# Patient Record
Sex: MALE | Race: WHITE | Employment: FULL TIME
[De-identification: names, ages, dates, MRNs, and addresses within clinical notes are randomized per-mention and may not be internally consistent; named-entity substitution may affect disease eponyms.]

---

## 2017-12-24 ENCOUNTER — HEALTH MAINTENANCE LETTER (OUTPATIENT)
Age: 27
End: 2017-12-24

## 2019-01-24 ENCOUNTER — HOSPITAL ENCOUNTER (EMERGENCY)
Facility: CLINIC | Age: 29
Discharge: HOME | End: 2019-01-24
Attending: PHYSICIAN ASSISTANT | Admitting: PHYSICIAN ASSISTANT
Payer: COMMERCIAL

## 2019-01-24 VITALS
HEART RATE: 84 BPM | DIASTOLIC BLOOD PRESSURE: 102 MMHG | RESPIRATION RATE: 18 BRPM | HEIGHT: 64 IN | TEMPERATURE: 98.5 F | OXYGEN SATURATION: 95 % | SYSTOLIC BLOOD PRESSURE: 143 MMHG | BODY MASS INDEX: 45.97 KG/M2

## 2019-01-24 DIAGNOSIS — M54.2 NECK PAIN: ICD-10-CM

## 2019-01-24 PROCEDURE — 25000132 ZZH RX MED GY IP 250 OP 250 PS 637: Performed by: PHYSICIAN ASSISTANT

## 2019-01-24 PROCEDURE — 99283 EMERGENCY DEPT VISIT LOW MDM: CPT

## 2019-01-24 RX ORDER — CYCLOBENZAPRINE HCL 10 MG
10 TABLET ORAL 3 TIMES DAILY PRN
Qty: 20 TABLET | Refills: 0 | Status: SHIPPED | OUTPATIENT
Start: 2019-01-24 | End: 2019-03-29

## 2019-01-24 RX ORDER — LIDOCAINE 4 G/G
1 PATCH TOPICAL ONCE
Status: DISCONTINUED | OUTPATIENT
Start: 2019-01-24 | End: 2019-01-25 | Stop reason: HOSPADM

## 2019-01-24 RX ORDER — OXYCODONE HYDROCHLORIDE 5 MG/1
5 TABLET ORAL ONCE
Status: COMPLETED | OUTPATIENT
Start: 2019-01-24 | End: 2019-01-24

## 2019-01-24 RX ORDER — CYCLOBENZAPRINE HCL 10 MG
10 TABLET ORAL ONCE
Status: COMPLETED | OUTPATIENT
Start: 2019-01-24 | End: 2019-01-24

## 2019-01-24 RX ORDER — LIDOCAINE 50 MG/G
1 PATCH TOPICAL EVERY 24 HOURS
Qty: 7 PATCH | Refills: 0 | Status: SHIPPED | OUTPATIENT
Start: 2019-01-24 | End: 2019-03-29

## 2019-01-24 RX ADMIN — OXYCODONE HYDROCHLORIDE 5 MG: 5 TABLET ORAL at 22:33

## 2019-01-24 RX ADMIN — CYCLOBENZAPRINE HYDROCHLORIDE 10 MG: 10 TABLET, FILM COATED ORAL at 22:33

## 2019-01-24 RX ADMIN — LIDOCAINE 1 PATCH: 560 PATCH PERCUTANEOUS; TOPICAL; TRANSDERMAL at 22:33

## 2019-01-24 ASSESSMENT — ENCOUNTER SYMPTOMS
NECK PAIN: 1
WEAKNESS: 0
NUMBNESS: 0
VOMITING: 0
NAUSEA: 1

## 2019-01-24 NOTE — LETTER
January 24, 2019      To Whom It May Concern:      Osorio Ceron was seen in our Emergency Department today, 01/24/19. Please excuse Osorio from work tomorrow(1/15/19)  I expect his condition to improve over the next 2-3 days.  He may return to work/school when improved.    Sincerely,        Nicole Oviedo PA-C

## 2019-01-24 NOTE — ED AVS SNAPSHOT
Emergency Department  [address] [facility] [address]  Phone:  [phone]  Fax:  [phone]                                    Osorio Ceron   MRN: [id number]    Department:   Emergency Department   Date of Visit:  1/24/2019           After Visit Summary Signature Page    I have received my discharge instructions, and my questions have been answered. I have discussed any challenges I see with this plan with the nurse or doctor.    ..........................................................................................................................................  Patient/Patient Representative Signature      ..........................................................................................................................................  Patient Representative Print Name and Relationship to Patient    ..................................................               ................................................  Date                                   Time    ..........................................................................................................................................  Reviewed by Signature/Title    ...................................................              ..............................................  Date                                               Time          22EPIC Rev 08/18

## 2019-01-25 NOTE — DISCHARGE INSTRUCTIONS
Discharge Instructions  Neck Strain    You have been seen today for a neck sprain or strain.  Neck strains usually result from an injury to the neck. Car accidents, contact sports, and falls are common causes of neck strain. Sometimes your neck can start to hurt because of increased activity, muscle tension, an abnormal sleeping position, or because of other problems like arthritis in the neck.     Neck pain usually comes from injured muscles and ligaments. Sometimes there is a herniated (?slipped?) disc. We do not usually do MRI scans to look for these right away, since most herniated discs will get better on their own with time. Today, we did not find any evidence that your neck pain was caused by a serious or dangerous condition. However, sometimes symptoms develop over time and cannot be found during an emergency visit, so it is very important that you follow up with your primary provider.    Generally, every Emergency Department visit should have a follow-up clinic visit with either a primary or a specialty clinic/provider. Please follow-up as instructed by your emergency provider today.    Return to the Emergency Department if:  You have increasing pain in your neck.  You develop difficulty swallowing or breathing.  You have numbness, weakness, or trouble moving your arms or legs.  You have severe dizziness and difficulty walking.  You are unable to control your bladder or bowels.  You develop severe headache or ringing in the ears.    What can I do to help myself at home?  If you had an injury, use cold for the first 1-2 days. Cold helps relieve pain and reduce inflammation.  Apply ice packs to the neck or areas of pain every 1-2 hours for 20 minutes at a time. Place a towel or cloth between your skin and the ice pack.  After the first 2 days, using heat can help with neck pain and stiffness. You may use a warm shower or bath, warm towels on the neck, or a heating pad. Do not sleep with a heating pad, as you  can be burned.   Pain medications - You may take a pain medication such as Tylenol  (acetaminophen), Advil  and Motrin  (ibuprofen), or Aleve  (naproxen).  It is usually best to rest the neck for 1-2 days after an injury, then start gentle stretching exercises.   It is helpful to place a small pillow under the nape of your neck to provide proper neutral positioning.   You should stay active and do your usual work as much as you can, unless this involves heavy physical labor. Ask your provider if you need work restrictions.  If you were given a prescription for medicine here today, be sure to read all of the information (including the package insert) that comes with your prescription.  This will include important information about the medicine, its side effects, and any warnings that you need to know about.  The pharmacist who fills the prescription can provide more information and answer questions you may have about the medicine.  If you have questions or concerns that the pharmacist cannot address, please call or return to the Emergency Department.   Remember that you can always come back to the Emergency Department if you are not able to see your regular provider in the amount of time listed above, if you get any new symptoms, or if there is anything that worries you.

## 2019-01-25 NOTE — ED PROVIDER NOTES
"  History     Chief Complaint:    Neck Pain    HPI   Nadiya \"Osorio\" Osmany is a 28 year old male who presents with neck pain. The patient reports that a few days ago he had right sided neck cramping that seemed to improve. Today, the patient woke up with the neck cramping back and its so painful that he wants to keep his head tucked into his right shoulder. He has been taking Ibuprofen and Aleve to combat the symptoms, with minimal relief. He notes that he is also nauseated 2/2 pain. The patient denies fever, numbness, tingling, weakness, vomiting, visual changes, or trauma to the neck or arms. No other associated neurologic symptoms. No other concerns at this time.     Allergies:  No known drug allergies.       Medications:    Atarax  Zofran  Zoloft  Depotesterone Cypoinoate     Problem List:    Mastecteomy, bilatral     Past Medical History:    Anxiett  Depression  obseity     Past Surgical History:    Tonsillectomy  Molar extractions  Transgender mastectomy     Family History:    Family history reviewed. No pertinent family history.     Social History:  Smoking Status: Never Smoker  Smokeless Tobacco: Never Used  Alcohol Use: Negative  Drug Use: Negative  PCP: Keaton Recio   Marital Status:  Single        Review of Systems   Eyes: Negative for visual disturbance.   Gastrointestinal: Positive for nausea. Negative for vomiting.   Musculoskeletal: Positive for neck pain.   Neurological: Negative for weakness and numbness.   All other systems reviewed and are negative.    Physical Exam     Patient Vitals for the past 24 hrs:   BP Temp Temp src Heart Rate Resp SpO2 Height   01/24/19 2148 (!) 166/113 98.5  F (36.9  C) Oral 109 18 99 % 1.626 m (5' 4\")      Physical Exam  General: Alert and oriented. Appears uncomfortable  Head:  The scalp, face, and head appear normal   Eyes:  Conjunctivae and sclerae are normal    ENT:    The oropharynx is normal    Uvula is in the midline     Moist mucous " membranes   Neck:  Patient has preserved ROM, but some difficulty 2/2 especially with leftward rotation.     No lymphadenopathy    There is no midline cervical spine pain/tenderness     Tenderness to the right paracervical area and trapezial area.   CV:  Regular rate and rhythm     Normal S1/S2    Radial pulses intact bilaterally.   Resp:  Lungs are clear to auscultation    Non-labored    No rales or wheezing   MS:  Preserved bicep, triceps, and deltoid strength bilaterally. Patient is able to hold fingers in resisted abduction, make the ok sign and the thumbs up sign bilaterally.   Skin:  No rash or acute skin lesions noted   Neuro: Speech is normal and fluent. Sensation intact in bilateral upper extremities.       Emergency Department Course     Interventions:  2233 Flexeril 10 mg PO  2233 Roxicodone 5 mg PO  2233 Lidocare 1 patch transdermal     Medications   Lidocaine (LIDOCARE) 4 % Patch 1 patch (1 patch Transdermal Given 1/24/19 2233)     And   lidocaine patch REMOVAL (not administered)     And   lidocaine patch in PLACE (not administered)   cyclobenzaprine (FLEXERIL) tablet 10 mg (10 mg Oral Given 1/24/19 2233)   oxyCODONE (ROXICODONE) tablet 5 mg (5 mg Oral Given 1/24/19 2233)        Emergency Department Course:     Nursing notes and vitals reviewed.    2220 I performed an exam of the patient as documented above.      I personally reviewed the exam results with the patient and answered all related questions prior to discharge.    Impression & Plan      Medical Decision Making:  Nadiya Ceron is a 28 year old female who presents to the emergency department today for evaluation of neck pain. He states this happened upon waking one day and was consistent with a neck strain. He denies any trauma or any neurologic symptoms/visula changes. Clinical examination is consistent with neck strain and muscle spasm. Without trauma no need for xrays. There is no evidence of cervical radiculopathy, ligamentous  instability, myelopathy, dissection, spinal tumor or abscess at this time. No need for MRI/CT at this time. I discussed worrisome symptoms/signs, if they were to evolve, that should prompt the patient to follow up more quickly or return to the ER. There are no red flag symptoms to suggest we need further workup or advanced imaging at this point. He was given the above interventions with improvement in symptoms. He will be discharged home with a prescription for Lidoderm patches and flexeril. He will follow up with his PCP in 2 days for recheck. He was asked to return immediately for weakness, numbness, tingling, fevers, vomiting, visual changes, uncontrolled pain or another concerns. All questions were answered prior to discharge. The patient understands and agrees to this plan.    Diagnosis:    ICD-10-CM    1. Neck pain M54.2         Disposition:   The patient is discharged to home.     Discharge Medications:     Review of your medicines      UNREVIEWED medicines. Ask your doctor about these medicines      Dose / Directions   azithromycin 250 MG tablet  Commonly known as:  ZITHROMAX  Used for:  S/P mastectomy, bilateral      Two tablets first day, then one tablet daily for four days.  Quantity:  6 tablet  Refills:  0     hydrOXYzine 25 MG tablet  Commonly known as:  ATARAX  Used for:  S/P mastectomy, bilateral      Dose:  25-50 mg  Take 1-2 tablets (25-50 mg) by mouth every 6 hours as needed for itching  Quantity:  60 tablet  Refills:  1     ondansetron 4 MG ODT tab  Commonly known as:  ZOFRAN-ODT  Used for:  S/P mastectomy, bilateral      Dose:  4-8 mg  Take 1-2 tablets (4-8 mg) by mouth every 8 hours as needed for nausea Dissolve ON the tongue.  Quantity:  20 tablet  Refills:  0     oxyCODONE 5 MG tablet  Commonly known as:  ROXICODONE  Used for:  S/P mastectomy, bilateral      Dose:  5-10 mg  Take 1-2 tablets (5-10 mg) by mouth every 3 hours as needed for pain or other (Moderate to Severe)  Quantity:  60  tablet  Refills:  0     testosterone cypionate 200 MG/ML injection  Commonly known as:  DEPOTESTOSTERONE      Dose:  50 mg  Inject 50 mg into the muscle once a week  Refills:  0     ZOLOFT PO      Dose:  150 mg  Take 150 mg by mouth daily  Refills:  0        START taking      Dose / Directions   cyclobenzaprine 10 MG tablet  Commonly known as:  FLEXERIL      Dose:  10 mg  Take 1 tablet (10 mg) by mouth 3 times daily as needed for muscle spasms  Quantity:  20 tablet  Refills:  0     lidocaine 5 % patch  Commonly known as:  LIDODERM      Dose:  1 patch  Place 1 patch onto the skin every 24 hours for 7 days  Quantity:  7 patch  Refills:  0           Where to get your medicines      Some of these will need a paper prescription and others can be bought over the counter. Ask your nurse if you have questions.    Bring a paper prescription for each of these medications    cyclobenzaprine 10 MG tablet    lidocaine 5 % patch         Scribe Disclosure:  I, Orla Severson, am serving as a scribe at 9:47 PM on 1/24/2019 to document services personally performed by Nicole Oviedo PA-C based on my observations and the provider's statements to me.      EMERGENCY DEPARTMENT       Nicole Oviedo PA-C  01/26/19 1187

## 2019-01-26 ENCOUNTER — APPOINTMENT (OUTPATIENT)
Dept: MRI IMAGING | Facility: CLINIC | Age: 29
End: 2019-01-26
Payer: COMMERCIAL

## 2019-01-26 ENCOUNTER — HOSPITAL ENCOUNTER (EMERGENCY)
Facility: CLINIC | Age: 29
Discharge: HOME | End: 2019-01-26
Attending: EMERGENCY MEDICINE | Admitting: EMERGENCY MEDICINE
Payer: COMMERCIAL

## 2019-01-26 VITALS
DIASTOLIC BLOOD PRESSURE: 102 MMHG | RESPIRATION RATE: 16 BRPM | HEART RATE: 94 BPM | HEIGHT: 64 IN | TEMPERATURE: 97.7 F | WEIGHT: 271 LBS | BODY MASS INDEX: 46.26 KG/M2 | SYSTOLIC BLOOD PRESSURE: 140 MMHG | OXYGEN SATURATION: 95 %

## 2019-01-26 DIAGNOSIS — M54.12 CERVICAL RADICULOPATHY: ICD-10-CM

## 2019-01-26 PROCEDURE — 96374 THER/PROPH/DIAG INJ IV PUSH: CPT

## 2019-01-26 PROCEDURE — 72141 MRI NECK SPINE W/O DYE: CPT

## 2019-01-26 PROCEDURE — 99285 EMERGENCY DEPT VISIT HI MDM: CPT | Mod: 25

## 2019-01-26 PROCEDURE — 96375 TX/PRO/DX INJ NEW DRUG ADDON: CPT

## 2019-01-26 PROCEDURE — 25000128 H RX IP 250 OP 636: Performed by: EMERGENCY MEDICINE

## 2019-01-26 RX ORDER — PREDNISONE 20 MG/1
TABLET ORAL
Qty: 10 TABLET | Refills: 0 | Status: SHIPPED | OUTPATIENT
Start: 2019-01-26 | End: 2019-03-29

## 2019-01-26 RX ORDER — LORAZEPAM 2 MG/ML
1 INJECTION INTRAMUSCULAR ONCE
Status: COMPLETED | OUTPATIENT
Start: 2019-01-26 | End: 2019-01-26

## 2019-01-26 RX ORDER — KETOROLAC TROMETHAMINE 15 MG/ML
15 INJECTION, SOLUTION INTRAMUSCULAR; INTRAVENOUS ONCE
Status: COMPLETED | OUTPATIENT
Start: 2019-01-26 | End: 2019-01-26

## 2019-01-26 RX ORDER — ONDANSETRON 2 MG/ML
4 INJECTION INTRAMUSCULAR; INTRAVENOUS ONCE
Status: COMPLETED | OUTPATIENT
Start: 2019-01-26 | End: 2019-01-26

## 2019-01-26 RX ADMIN — HYDROMORPHONE HYDROCHLORIDE 0.5 MG: 1 INJECTION, SOLUTION INTRAMUSCULAR; INTRAVENOUS; SUBCUTANEOUS at 09:17

## 2019-01-26 RX ADMIN — KETOROLAC TROMETHAMINE 15 MG: 15 INJECTION, SOLUTION INTRAMUSCULAR; INTRAVENOUS at 09:15

## 2019-01-26 RX ADMIN — ONDANSETRON 4 MG: 2 INJECTION INTRAMUSCULAR; INTRAVENOUS at 09:14

## 2019-01-26 RX ADMIN — LORAZEPAM 1 MG: 2 INJECTION INTRAMUSCULAR; INTRAVENOUS at 09:19

## 2019-01-26 ASSESSMENT — ENCOUNTER SYMPTOMS
WEAKNESS: 1
NECK PAIN: 1
NUMBNESS: 1

## 2019-01-26 ASSESSMENT — MIFFLIN-ST. JEOR: SCORE: 2100.25

## 2019-01-26 NOTE — ED PROVIDER NOTES
"  History     Chief Complaint:  Neck Pain    HPI   Osorio Ceron is a 28 year old male who presents to the emergency department today for evaluation of neck pain. Per chart review, patient was seen in the emergency department on 01/24 for neck pain for which he was discharged with muscle relaxants. Today, he reports no relief in his neck and shoulder pain with the muscle relaxants, and has developed right arm tingling and weakness. He describes having a hard time lifting a glass. He denies numbness or weakness in his legs.     Allergies:  No Known Drug Allergies      Medications:    Flexeril   Atarax   Zoloft   Testosterone cypionate     Past Medical History:    Anxiety and depression   Morbid obesity     Past Surgical History:    Tonsillectomy   Molar extractions   Transgender mastectomy, bilateral     Family History:    Father: Prostate cancer  Maternal Grandfather: Heart disease, hypertension  Maternal Grandmother: Diabetes, osteoarthritis  Paternal Grandfather: Heart disease, diabetes, hypertension   No family history of breast cancer, ovarian cancer, or rheumatologic disease.     Social History:  Smoking Status: Never Smoker  Smokeless Tobacco: Never Used  Alcohol Use: Negative  Drug Use: Negative    Marital Status: Single      Review of Systems   Musculoskeletal: Positive for neck pain.        Shoulder pain   Neurological: Positive for weakness (right arm, not legs) and numbness (right arm, not legs).   All other systems reviewed and are negative.    Physical Exam     Patient Vitals for the past 24 hrs:   BP Temp Temp src Pulse Heart Rate Resp SpO2 Height Weight   01/26/19 0915 (!) 132/92 -- -- 83 -- -- 95 % -- --   01/26/19 0913 (!) 134/120 -- -- 80 -- -- -- -- --   01/26/19 0748 (!) 154/99 97.7  F (36.5  C) Oral -- 83 16 93 % 1.626 m (5' 4\") 122.9 kg (271 lb)     Physical Exam  GENERAL: well developed, pleasant  HEAD: atraumatic  EYES: pupils reactive, extraocular muscles intact, conjunctivae " normal  ENT:  mucus membranes moist  NECK:  trachea midline, normal range of motion  RESPIRATORY: no tachypnea, breath sounds clear to auscultation   CVS: normal S1/S2, no murmurs, intact distal pulses  ABDOMEN: soft, nontender, nondistention  MUSCULOSKELETAL: holding neck to the side, slight tapping for Spurling's test with reproducible pain down right arm, 5/5 strength in biceps and triceps   SKIN: warm and dry, no acute rashes or ulceration  NEURO: GCS 15, cranial nerves intact, alert and oriented x3  PSYCH:  Mood/affect normal    Emergency Department Course     Imaging:  Radiology findings were communicated with the patient who voiced understanding of the findings.    Cervical spine MRI w/o contrast  Moderate-sized right paramedian C5-C6 disc protrusion with some minimal superior extrusion. This disc is causing moderate centralcanal stenosis and moderate to severe right-sided foraminal stenosis in addition to some cord deformity.  Reading per radiology     Interventions:  0802 Ativan 1 mg IV  0909 Dilaudid 1 mg IV  0909 Toradol 15 mg IV  0909 Zofran 4 mg IV     Emergency Department Course:    0754 Nursing notes and vitals reviewed.    0757 I performed an exam of the patient as documented above.     0928 The patient was sent for an MRI while in the emergency department, results above.     1005 Rechecked and updated.      1015 I spoke with Dr. Nima Mcdonnell of the orthopedic surgery service from [facility] regarding patient's presentation, findings, and plan of care.    1030 I personally reviewed the imaging results with the patient and answered all related questions prior to discharge.    Impression & Plan      Medical Decision Making:  Osorio Ceron is a 28 year old male who presents to the emergency department today for evaluation of neck, shoulder pain with radicular symptoms down his right arm with numbness in his fingers and weakness with grabbing a glass today.  Patient denies any issues in  his legs.  Patient was recently here noted stiffness and needing to turn his head to the right and flexed.  MRI today shows findings as above.  Did speak with Dr. Mcdonnell from orthopedic spine regarding follow-up.  Discussed indications for return with the patient.  Patient's neuro exam shows intact strength did not feel he needs emergent surgery at this time.    Diagnosis:    ICD-10-CM    1. Cervical radiculopathy M54.12      Disposition:   The patient is discharged to home.    Discharge Medications:START taking      Dose / Directions   predniSONE 20 MG tablet  Commonly known as:  DELTASONE      Take two tablets (= 40mg) each day for 5 (five) days.  Quantity:  10 tablet  Refills:  0       Scribe Disclosure:  Jud FLORES, am serving as a scribe at 7:57 AM on 1/26/2019 to document services personally performed by Leo Funez MD based on my observations and the provider's statements to me.       EMERGENCY DEPARTMENT       Leo Funez MD  01/27/19 1013

## 2019-01-26 NOTE — ED AVS SNAPSHOT
Emergency Department  [address] [facility] [address]  Phone:  [phone]  Fax:  [phone]                                    Osorio Ceron   MRN: [id number]    Department:   Emergency Department   Date of Visit:  1/26/2019           After Visit Summary Signature Page    I have received my discharge instructions, and my questions have been answered. I have discussed any challenges I see with this plan with the nurse or doctor.    ..........................................................................................................................................  Patient/Patient Representative Signature      ..........................................................................................................................................  Patient Representative Print Name and Relationship to Patient    ..................................................               ................................................  Date                                   Time    ..........................................................................................................................................  Reviewed by Signature/Title    ...................................................              ..............................................  Date                                               Time          22EPIC Rev 08/18

## 2019-01-27 ENCOUNTER — NURSE TRIAGE (OUTPATIENT)
Dept: NURSING | Facility: CLINIC | Age: 29
End: 2019-01-27

## 2019-01-27 NOTE — TELEPHONE ENCOUNTER
"From MD notes:  \"Discussed indications for return with the patient.  Patient's neuro exam shows intact strength did not feel he needs emergent surgery at this time.\" What is above is what the ER MD stated. I advised the patient that he can work but to be in touch with his primary MD to write notes to restrict work or changes in his symptoms. He will do that. He hasn't set up an appointment yet. I told him no virgerous activity , bending twisting and the like. He works in a call center.  Kesha Montenegro RN-[facility]    "

## 2019-03-26 RX ORDER — SERTRALINE HYDROCHLORIDE 100 MG/1
200 TABLET, FILM COATED ORAL DAILY
COMMUNITY

## 2019-04-01 ENCOUNTER — ANESTHESIA (OUTPATIENT)
Dept: SURGERY | Facility: CLINIC | Age: 29
End: 2019-04-01
Payer: COMMERCIAL

## 2019-04-01 ENCOUNTER — HOSPITAL ENCOUNTER (OUTPATIENT)
Facility: CLINIC | Age: 29
Discharge: HOME | End: 2019-04-02
Attending: ORTHOPAEDIC SURGERY | Admitting: ORTHOPAEDIC SURGERY
Payer: COMMERCIAL

## 2019-04-01 ENCOUNTER — APPOINTMENT (OUTPATIENT)
Dept: GENERAL RADIOLOGY | Facility: CLINIC | Age: 29
End: 2019-04-01
Attending: ORTHOPAEDIC SURGERY
Payer: COMMERCIAL

## 2019-04-01 ENCOUNTER — ANESTHESIA EVENT (OUTPATIENT)
Dept: SURGERY | Facility: CLINIC | Age: 29
End: 2019-04-01
Payer: COMMERCIAL

## 2019-04-01 DIAGNOSIS — M50.10 HERNIATION OF CERVICAL INTERVERTEBRAL DISC WITH RADICULOPATHY: Primary | ICD-10-CM

## 2019-04-01 DIAGNOSIS — M50.322 OTHER CERVICAL DISC DEGENERATION AT C5-C6 LEVEL: ICD-10-CM

## 2019-04-01 LAB — HCG UR QL: NEGATIVE

## 2019-04-01 PROCEDURE — 25000125 ZZHC RX 250: Performed by: ORTHOPAEDIC SURGERY

## 2019-04-01 PROCEDURE — 25000128 H RX IP 250 OP 636: Performed by: NURSE ANESTHETIST, CERTIFIED REGISTERED

## 2019-04-01 PROCEDURE — L8699 PROSTHETIC IMPLANT NOS: HCPCS | Performed by: ORTHOPAEDIC SURGERY

## 2019-04-01 PROCEDURE — 25000128 H RX IP 250 OP 636: Performed by: ANESTHESIOLOGY

## 2019-04-01 PROCEDURE — 71000012 ZZH RECOVERY PHASE 1 LEVEL 1 FIRST HR: Performed by: ORTHOPAEDIC SURGERY

## 2019-04-01 PROCEDURE — 25000125 ZZHC RX 250: Performed by: NURSE ANESTHETIST, CERTIFIED REGISTERED

## 2019-04-01 PROCEDURE — 36000069 ZZH SURGERY LEVEL 5 EA 15 ADDTL MIN: Performed by: ORTHOPAEDIC SURGERY

## 2019-04-01 PROCEDURE — 25800030 ZZH RX IP 258 OP 636: Performed by: NURSE ANESTHETIST, CERTIFIED REGISTERED

## 2019-04-01 PROCEDURE — 25000128 H RX IP 250 OP 636: Performed by: ORTHOPAEDIC SURGERY

## 2019-04-01 PROCEDURE — 25800030 ZZH RX IP 258 OP 636: Performed by: ORTHOPAEDIC SURGERY

## 2019-04-01 PROCEDURE — 27210794 ZZH OR GENERAL SUPPLY STERILE: Performed by: ORTHOPAEDIC SURGERY

## 2019-04-01 PROCEDURE — 40000277 XR SURGERY CARM FLUORO LESS THAN 5 MIN W STILLS

## 2019-04-01 PROCEDURE — 37000008 ZZH ANESTHESIA TECHNICAL FEE, 1ST 30 MIN: Performed by: ORTHOPAEDIC SURGERY

## 2019-04-01 PROCEDURE — 81025 URINE PREGNANCY TEST: CPT | Performed by: ANESTHESIOLOGY

## 2019-04-01 PROCEDURE — 37000009 ZZH ANESTHESIA TECHNICAL FEE, EACH ADDTL 15 MIN: Performed by: ORTHOPAEDIC SURGERY

## 2019-04-01 PROCEDURE — 36000071 ZZH SURGERY LEVEL 5 W FLUORO 1ST 30 MIN: Performed by: ORTHOPAEDIC SURGERY

## 2019-04-01 PROCEDURE — 25000566 ZZH SEVOFLURANE, EA 15 MIN: Performed by: ORTHOPAEDIC SURGERY

## 2019-04-01 PROCEDURE — 40000170 ZZH STATISTIC PRE-PROCEDURE ASSESSMENT II: Performed by: ORTHOPAEDIC SURGERY

## 2019-04-01 PROCEDURE — 25000132 ZZH RX MED GY IP 250 OP 250 PS 637: Performed by: ORTHOPAEDIC SURGERY

## 2019-04-01 DEVICE — IMPLANTABLE DEVICE: Type: IMPLANTABLE DEVICE | Site: SPINE CERVICAL | Status: FUNCTIONAL

## 2019-04-01 RX ORDER — ONDANSETRON 2 MG/ML
INJECTION INTRAMUSCULAR; INTRAVENOUS PRN
Status: DISCONTINUED | OUTPATIENT
Start: 2019-04-01 | End: 2019-04-01

## 2019-04-01 RX ORDER — ACETAMINOPHEN 500 MG
1000 TABLET ORAL 2 TIMES DAILY PRN
COMMUNITY

## 2019-04-01 RX ORDER — METOCLOPRAMIDE 5 MG/1
10 TABLET ORAL EVERY 6 HOURS PRN
Status: DISCONTINUED | OUTPATIENT
Start: 2019-04-01 | End: 2019-04-02 | Stop reason: HOSPADM

## 2019-04-01 RX ORDER — ACETAMINOPHEN 325 MG/1
975 TABLET ORAL ONCE
Status: COMPLETED | OUTPATIENT
Start: 2019-04-01 | End: 2019-04-01

## 2019-04-01 RX ORDER — FENTANYL CITRATE 50 UG/ML
25-50 INJECTION, SOLUTION INTRAMUSCULAR; INTRAVENOUS
Status: DISCONTINUED | OUTPATIENT
Start: 2019-04-01 | End: 2019-04-01 | Stop reason: HOSPADM

## 2019-04-01 RX ORDER — CEFAZOLIN SODIUM 1 G/3ML
1 INJECTION, POWDER, FOR SOLUTION INTRAMUSCULAR; INTRAVENOUS SEE ADMIN INSTRUCTIONS
Status: DISCONTINUED | OUTPATIENT
Start: 2019-04-01 | End: 2019-04-01 | Stop reason: HOSPADM

## 2019-04-01 RX ORDER — GLYCOPYRROLATE 0.2 MG/ML
INJECTION, SOLUTION INTRAMUSCULAR; INTRAVENOUS PRN
Status: DISCONTINUED | OUTPATIENT
Start: 2019-04-01 | End: 2019-04-01

## 2019-04-01 RX ORDER — HYDROMORPHONE HYDROCHLORIDE 1 MG/ML
0.2 INJECTION, SOLUTION INTRAMUSCULAR; INTRAVENOUS; SUBCUTANEOUS
Status: DISCONTINUED | OUTPATIENT
Start: 2019-04-01 | End: 2019-04-02 | Stop reason: HOSPADM

## 2019-04-01 RX ORDER — NALOXONE HYDROCHLORIDE 0.4 MG/ML
.1-.4 INJECTION, SOLUTION INTRAMUSCULAR; INTRAVENOUS; SUBCUTANEOUS
Status: ACTIVE | OUTPATIENT
Start: 2019-04-01 | End: 2019-04-02

## 2019-04-01 RX ORDER — LIDOCAINE HYDROCHLORIDE 20 MG/ML
INJECTION, SOLUTION INFILTRATION; PERINEURAL PRN
Status: DISCONTINUED | OUTPATIENT
Start: 2019-04-01 | End: 2019-04-01

## 2019-04-01 RX ORDER — LIDOCAINE 40 MG/G
CREAM TOPICAL
Status: DISCONTINUED | OUTPATIENT
Start: 2019-04-01 | End: 2019-04-01 | Stop reason: HOSPADM

## 2019-04-01 RX ORDER — ONDANSETRON 4 MG/1
4 TABLET, ORALLY DISINTEGRATING ORAL EVERY 30 MIN PRN
Status: DISCONTINUED | OUTPATIENT
Start: 2019-04-01 | End: 2019-04-01 | Stop reason: HOSPADM

## 2019-04-01 RX ORDER — NALOXONE HYDROCHLORIDE 0.4 MG/ML
.1-.4 INJECTION, SOLUTION INTRAMUSCULAR; INTRAVENOUS; SUBCUTANEOUS
Status: DISCONTINUED | OUTPATIENT
Start: 2019-04-01 | End: 2019-04-02 | Stop reason: HOSPADM

## 2019-04-01 RX ORDER — SERTRALINE HYDROCHLORIDE 100 MG/1
200 TABLET, FILM COATED ORAL DAILY
Status: DISCONTINUED | OUTPATIENT
Start: 2019-04-01 | End: 2019-04-02 | Stop reason: HOSPADM

## 2019-04-01 RX ORDER — METOCLOPRAMIDE HYDROCHLORIDE 5 MG/ML
10 INJECTION INTRAMUSCULAR; INTRAVENOUS EVERY 6 HOURS PRN
Status: DISCONTINUED | OUTPATIENT
Start: 2019-04-01 | End: 2019-04-02 | Stop reason: HOSPADM

## 2019-04-01 RX ORDER — ONDANSETRON 4 MG/1
4 TABLET, ORALLY DISINTEGRATING ORAL EVERY 6 HOURS PRN
Status: DISCONTINUED | OUTPATIENT
Start: 2019-04-01 | End: 2019-04-02 | Stop reason: HOSPADM

## 2019-04-01 RX ORDER — SODIUM CHLORIDE 9 MG/ML
INJECTION, SOLUTION INTRAVENOUS CONTINUOUS
Status: DISCONTINUED | OUTPATIENT
Start: 2019-04-01 | End: 2019-04-02 | Stop reason: HOSPADM

## 2019-04-01 RX ORDER — SODIUM CHLORIDE, SODIUM LACTATE, POTASSIUM CHLORIDE, CALCIUM CHLORIDE 600; 310; 30; 20 MG/100ML; MG/100ML; MG/100ML; MG/100ML
INJECTION, SOLUTION INTRAVENOUS CONTINUOUS PRN
Status: DISCONTINUED | OUTPATIENT
Start: 2019-04-01 | End: 2019-04-01

## 2019-04-01 RX ORDER — SODIUM CHLORIDE, SODIUM LACTATE, POTASSIUM CHLORIDE, CALCIUM CHLORIDE 600; 310; 30; 20 MG/100ML; MG/100ML; MG/100ML; MG/100ML
INJECTION, SOLUTION INTRAVENOUS CONTINUOUS
Status: DISCONTINUED | OUTPATIENT
Start: 2019-04-01 | End: 2019-04-01 | Stop reason: HOSPADM

## 2019-04-01 RX ORDER — TESTOSTERONE CYPIONATE 200 MG/ML
70 INJECTION, SOLUTION INTRAMUSCULAR WEEKLY
Status: DISCONTINUED | OUTPATIENT
Start: 2019-04-07 | End: 2019-04-02 | Stop reason: HOSPADM

## 2019-04-01 RX ORDER — LABETALOL HYDROCHLORIDE 5 MG/ML
INJECTION, SOLUTION INTRAVENOUS PRN
Status: DISCONTINUED | OUTPATIENT
Start: 2019-04-01 | End: 2019-04-01

## 2019-04-01 RX ORDER — CEFAZOLIN SODIUM IN 0.9 % NACL 3 G/100 ML
3 INTRAVENOUS SOLUTION, PIGGYBACK (ML) INTRAVENOUS
Status: COMPLETED | OUTPATIENT
Start: 2019-04-01 | End: 2019-04-01

## 2019-04-01 RX ORDER — FENTANYL CITRATE 50 UG/ML
INJECTION, SOLUTION INTRAMUSCULAR; INTRAVENOUS PRN
Status: DISCONTINUED | OUTPATIENT
Start: 2019-04-01 | End: 2019-04-01

## 2019-04-01 RX ORDER — VECURONIUM BROMIDE 1 MG/ML
INJECTION, POWDER, LYOPHILIZED, FOR SOLUTION INTRAVENOUS PRN
Status: DISCONTINUED | OUTPATIENT
Start: 2019-04-01 | End: 2019-04-01

## 2019-04-01 RX ORDER — PROPOFOL 10 MG/ML
INJECTION, EMULSION INTRAVENOUS CONTINUOUS PRN
Status: DISCONTINUED | OUTPATIENT
Start: 2019-04-01 | End: 2019-04-01

## 2019-04-01 RX ORDER — LIDOCAINE 40 MG/G
CREAM TOPICAL
Status: DISCONTINUED | OUTPATIENT
Start: 2019-04-01 | End: 2019-04-02 | Stop reason: HOSPADM

## 2019-04-01 RX ORDER — NEOSTIGMINE METHYLSULFATE 1 MG/ML
VIAL (ML) INJECTION PRN
Status: DISCONTINUED | OUTPATIENT
Start: 2019-04-01 | End: 2019-04-01

## 2019-04-01 RX ORDER — ONDANSETRON 2 MG/ML
4 INJECTION INTRAMUSCULAR; INTRAVENOUS EVERY 30 MIN PRN
Status: DISCONTINUED | OUTPATIENT
Start: 2019-04-01 | End: 2019-04-01 | Stop reason: HOSPADM

## 2019-04-01 RX ORDER — HYDROMORPHONE HYDROCHLORIDE 1 MG/ML
.3-.5 INJECTION, SOLUTION INTRAMUSCULAR; INTRAVENOUS; SUBCUTANEOUS EVERY 5 MIN PRN
Status: DISCONTINUED | OUTPATIENT
Start: 2019-04-01 | End: 2019-04-01 | Stop reason: HOSPADM

## 2019-04-01 RX ORDER — PROPOFOL 10 MG/ML
INJECTION, EMULSION INTRAVENOUS PRN
Status: DISCONTINUED | OUTPATIENT
Start: 2019-04-01 | End: 2019-04-01

## 2019-04-01 RX ORDER — INDOMETHACIN 25 MG/1
25 CAPSULE ORAL
Status: DISCONTINUED | OUTPATIENT
Start: 2019-04-01 | End: 2019-04-02 | Stop reason: HOSPADM

## 2019-04-01 RX ORDER — ONDANSETRON 2 MG/ML
4 INJECTION INTRAMUSCULAR; INTRAVENOUS EVERY 6 HOURS PRN
Status: DISCONTINUED | OUTPATIENT
Start: 2019-04-01 | End: 2019-04-02 | Stop reason: HOSPADM

## 2019-04-01 RX ORDER — HYDROCODONE BITARTRATE AND ACETAMINOPHEN 5; 325 MG/1; MG/1
1-2 TABLET ORAL EVERY 4 HOURS PRN
Status: DISCONTINUED | OUTPATIENT
Start: 2019-04-01 | End: 2019-04-02 | Stop reason: HOSPADM

## 2019-04-01 RX ORDER — DEXAMETHASONE SODIUM PHOSPHATE 4 MG/ML
INJECTION, SOLUTION INTRA-ARTICULAR; INTRALESIONAL; INTRAMUSCULAR; INTRAVENOUS; SOFT TISSUE PRN
Status: DISCONTINUED | OUTPATIENT
Start: 2019-04-01 | End: 2019-04-01

## 2019-04-01 RX ADMIN — DEXAMETHASONE SODIUM PHOSPHATE 4 MG: 4 INJECTION, SOLUTION INTRA-ARTICULAR; INTRALESIONAL; INTRAMUSCULAR; INTRAVENOUS; SOFT TISSUE at 07:57

## 2019-04-01 RX ADMIN — PHENYLEPHRINE HYDROCHLORIDE 100 MCG: 10 INJECTION, SOLUTION INTRAMUSCULAR; INTRAVENOUS; SUBCUTANEOUS at 09:21

## 2019-04-01 RX ADMIN — ONDANSETRON 4 MG: 2 INJECTION INTRAMUSCULAR; INTRAVENOUS at 10:10

## 2019-04-01 RX ADMIN — GLYCOPYRROLATE 0.8 MG: 0.2 INJECTION, SOLUTION INTRAMUSCULAR; INTRAVENOUS at 10:25

## 2019-04-01 RX ADMIN — SODIUM CHLORIDE: 9 INJECTION, SOLUTION INTRAVENOUS at 12:43

## 2019-04-01 RX ADMIN — HYDROMORPHONE HYDROCHLORIDE 0.25 MG: 1 INJECTION, SOLUTION INTRAMUSCULAR; INTRAVENOUS; SUBCUTANEOUS at 09:54

## 2019-04-01 RX ADMIN — VECURONIUM BROMIDE 2 MG: 1 INJECTION, POWDER, LYOPHILIZED, FOR SOLUTION INTRAVENOUS at 08:40

## 2019-04-01 RX ADMIN — PROPOFOL 200 MG: 10 INJECTION, EMULSION INTRAVENOUS at 07:40

## 2019-04-01 RX ADMIN — VECURONIUM BROMIDE 2 MG: 1 INJECTION, POWDER, LYOPHILIZED, FOR SOLUTION INTRAVENOUS at 09:11

## 2019-04-01 RX ADMIN — CEFAZOLIN 1 G: 1 INJECTION, POWDER, FOR SOLUTION INTRAMUSCULAR; INTRAVENOUS at 09:52

## 2019-04-01 RX ADMIN — LIDOCAINE HYDROCHLORIDE 100 MG: 20 INJECTION, SOLUTION INFILTRATION; PERINEURAL at 07:37

## 2019-04-01 RX ADMIN — ROCURONIUM BROMIDE 50 MG: 10 INJECTION INTRAVENOUS at 07:40

## 2019-04-01 RX ADMIN — FENTANYL CITRATE 50 MCG: 50 INJECTION, SOLUTION INTRAMUSCULAR; INTRAVENOUS at 08:16

## 2019-04-01 RX ADMIN — SODIUM CHLORIDE, POTASSIUM CHLORIDE, SODIUM LACTATE AND CALCIUM CHLORIDE: 600; 310; 30; 20 INJECTION, SOLUTION INTRAVENOUS at 07:44

## 2019-04-01 RX ADMIN — DEXMEDETOMIDINE HYDROCHLORIDE 20 MCG: 100 INJECTION, SOLUTION INTRAVENOUS at 07:51

## 2019-04-01 RX ADMIN — LABETALOL HYDROCHLORIDE 10 MG: 5 INJECTION INTRAVENOUS at 08:42

## 2019-04-01 RX ADMIN — HYDROMORPHONE HYDROCHLORIDE 0.25 MG: 1 INJECTION, SOLUTION INTRAMUSCULAR; INTRAVENOUS; SUBCUTANEOUS at 10:43

## 2019-04-01 RX ADMIN — FENTANYL CITRATE 100 MCG: 50 INJECTION, SOLUTION INTRAMUSCULAR; INTRAVENOUS at 07:37

## 2019-04-01 RX ADMIN — ONDANSETRON 4 MG: 2 INJECTION INTRAMUSCULAR; INTRAVENOUS at 15:52

## 2019-04-01 RX ADMIN — VECURONIUM BROMIDE 2 MG: 1 INJECTION, POWDER, LYOPHILIZED, FOR SOLUTION INTRAVENOUS at 08:06

## 2019-04-01 RX ADMIN — ACETAMINOPHEN 975 MG: 325 TABLET, FILM COATED ORAL at 06:18

## 2019-04-01 RX ADMIN — SODIUM CHLORIDE, POTASSIUM CHLORIDE, SODIUM LACTATE AND CALCIUM CHLORIDE: 600; 310; 30; 20 INJECTION, SOLUTION INTRAVENOUS at 09:27

## 2019-04-01 RX ADMIN — NEOSTIGMINE METHYLSULFATE 5 MG: 1 INJECTION, SOLUTION INTRAVENOUS at 10:25

## 2019-04-01 RX ADMIN — LABETALOL HYDROCHLORIDE 15 MG: 5 INJECTION INTRAVENOUS at 08:49

## 2019-04-01 RX ADMIN — PHENYLEPHRINE HYDROCHLORIDE 100 MCG: 10 INJECTION, SOLUTION INTRAMUSCULAR; INTRAVENOUS; SUBCUTANEOUS at 09:06

## 2019-04-01 RX ADMIN — HYDROMORPHONE HYDROCHLORIDE 0.5 MG: 1 INJECTION, SOLUTION INTRAMUSCULAR; INTRAVENOUS; SUBCUTANEOUS at 08:21

## 2019-04-01 RX ADMIN — PHENYLEPHRINE HYDROCHLORIDE 150 MCG: 10 INJECTION, SOLUTION INTRAMUSCULAR; INTRAVENOUS; SUBCUTANEOUS at 09:09

## 2019-04-01 RX ADMIN — PHENYLEPHRINE HYDROCHLORIDE 150 MCG: 10 INJECTION, SOLUTION INTRAMUSCULAR; INTRAVENOUS; SUBCUTANEOUS at 09:15

## 2019-04-01 RX ADMIN — MIDAZOLAM 2 MG: 1 INJECTION INTRAMUSCULAR; INTRAVENOUS at 07:37

## 2019-04-01 RX ADMIN — VECURONIUM BROMIDE 2 MG: 1 INJECTION, POWDER, LYOPHILIZED, FOR SOLUTION INTRAVENOUS at 09:43

## 2019-04-01 RX ADMIN — FENTANYL CITRATE 50 MCG: 50 INJECTION, SOLUTION INTRAMUSCULAR; INTRAVENOUS at 08:40

## 2019-04-01 RX ADMIN — Medication 3 G: at 07:50

## 2019-04-01 RX ADMIN — DEXMEDETOMIDINE HYDROCHLORIDE 20 MCG: 100 INJECTION, SOLUTION INTRAVENOUS at 08:49

## 2019-04-01 RX ADMIN — PROPOFOL 50 MCG/KG/MIN: 10 INJECTION, EMULSION INTRAVENOUS at 07:41

## 2019-04-01 RX ADMIN — HYDROCODONE BITARTRATE AND ACETAMINOPHEN 1 TABLET: 5; 325 TABLET ORAL at 23:52

## 2019-04-01 RX ADMIN — SODIUM CHLORIDE: 9 INJECTION, SOLUTION INTRAVENOUS at 22:37

## 2019-04-01 RX ADMIN — Medication 0.5 MG: at 11:47

## 2019-04-01 RX ADMIN — PROPOFOL: 10 INJECTION, EMULSION INTRAVENOUS at 08:44

## 2019-04-01 ASSESSMENT — ENCOUNTER SYMPTOMS: SEIZURES: 0

## 2019-04-01 ASSESSMENT — MIFFLIN-ST. JEOR: SCORE: 2108.42

## 2019-04-01 ASSESSMENT — LIFESTYLE VARIABLES: TOBACCO_USE: 0

## 2019-04-01 NOTE — OP NOTE
Procedure Date: 04/01/2019     SURGEON: Oleksandr Mcdonnell MD     ASSISTANT:   Sahara Mckeon PA-C      PREOPERATIVE DIAGNOSES:  C5 to C6 disk herniation on the right side with a right C6 radiculopathy.      POSTOPERATIVE DIAGNOSES:  C5 to C6 disk herniation on the right side with a right C6 radiculopathy.      PROCEDURES:  C5 to C6 anterior cervical discectomy and total disk replacement using the Manas disk.      INDICATIONS FOR PROCEDURE:  Mr. Ceron is a 28-year-old man who has a 3-month history of neck and right arm pain.  There is no specific injury associated with the onset.  His preoperative evaluation revealed motor strength to be grade 4/5 in the right biceps.  Sensation decreased over his lateral upper arm, lateral forearm, thumb and dorsum of his hand.  His reflexes were asymmetric with absent brachioradialis reflex on the right and trace at the left.  His preoperative MRI scan revealed a large disk herniation on the right side at the central and right at the L5 to L6 level causing lateral recess stenosis and severe foraminal stenosis on the right.  Because his symptoms were refractory to conservative treatment, he elected for operative treatment.      DESCRIPTION OF PROCEDURE:  After informed consent was obtained from the patient, satisfactory general anesthesia achieved, adhesive tape was used to distract the patient's shoulders distally.  His neck was prepped and draped in a sterile fashion.  A rolled towel was placed under his shoulder blades.  After appropriate patient and site identification, a transverse incision measuring 3 cm was made at the level of the distal thyroid cartilage from the midline to the left side.  Subcutaneous dissection continued with the Bovie down to the platysma muscle.  The platysma muscle was isolated along its inferior surface and divided transversely.  The interval between the sternocleidomastoid and sternothyroid muscle was divided bluntly.  The carotid pulse was  palpated and the interval between the trachea and esophagus and carotid sheath was divided bluntly.  Precervical fascia was divided.  A 22-gauge spinal needle was placed in the C5 to C6 disk space and lateral C-arm image obtained confirming position at C5 to C6.  Dissection continued subperiosteally taking the longus colli muscles off the C5 and C6 vertebral bodies.  Trimline retractors were placed under the muscle and the annulus at C5 to C6 was cut with the Bovie.  A Carter elavator was used to dissect the disk off the inferior endplate of C5, inferior endplate of C6 and curets and pituitaries were used to remove the entirety of the disk.  The overhanging lip of bone off the anterior inferior corner of C5 was thinned with the Midas drill.  Then, using the Manas guide, distraction pins were placed in the C5 and C6 vertebral bodies and distraction placed across the disk.  The entirety of the disk was removed from uncovertebral joint to uncovertebral joint all the way back to the posterior longitudinal ligament.  There was a rent encountered in the posterior longitudinal ligament just to the right of midline.  This was propagated with a Kerrison rongeur and a large amount of herniated disk material was removed from beneath it in a piecemeal fashion.  Additional fragments were milked from the foramen.  After the decompression, a nerve hook could be swept under the vertebral bodies and out into the right C6 foramen and there was no evidence of any further tethering material.  In preparation to place a Manas disk, the entirety of the posterior longitudinal ligament was removed, the Midas drill was used to make the endplates parallel and under C-arm guidance, a 14 mm trial was placed into the disk space and this appeared to fit anatomically.  With the trial in place, there appeared to be a crack across the pedicle of C5.  The disk space did seem a bit hypermobile and to rule out a fracture, the O-arm was brought in and  intraoperative CT scan obtained, which showed that there were no fractures at the C5 level or any other level.  The procedure then proceeded preparing the endplates using the [employer] milling device to accept the 14 mm implant.  The implant was filled on the back table with saline and was placed into the diskectomy defect.  Distraction was taken off the disk, capturing the implant nicely.  An attempt was made to dislocate it, and this was unsuccessful.  The distraction device was removed as well as the distraction pins.  The wound was irrigated with copious amounts of antibiotic solution.  A lateral C-arm image was obtained showing good position of the implant at the C5 to C6 level and then an AP view was also obtained showing good position of the implant at the C5 to C6 level.  A 10-German KORIN drain was placed along the anterior border of the spine and brought out inferior to the incision.  The platysma muscle was reapproximated with multiple simple sutures of 2-0 Vicryl and a running subcuticular stitch of 3-0 Vicryl.  Sterile Steri-Strips and dressing were applied.  The KORIN was hooked to suction and was placed into a soft cervical collar, awakened, extubated, and left the operating room in good condition.  Estimated blood loss was 15 mL.  Replacement was 1800 mL crystalloid.  There were no complications.  Total time for the procedure from skin to skin was 120 minutes.      MARVIN Mckeon's assistance was essential during the entire procedure for safe retraction of visceral and neural tissue, suctioning of blood for visualization and holding alignment during placement of the implants.       YURIDIA MCDONNELL MD             D: 2019   T: 2019   MT: BOOKER      Name:     MARLEN GUO   MRN:      [id number]        Account:        [id number]   :      1990           Procedure Date: 2019      Document: [id number]       cc: Yuridia Mcdonnell MD

## 2019-04-01 NOTE — BRIEF OP NOTE
[facility]    Brief Operative Note    Pre-operative diagnosis: C5-6 DISC HERNIATION, RIGHT SIDE; RIGHT C6 RADICULOPATHY  Post-operative diagnosis same  Procedure: Procedure(s):  C5-6 ANTERIOR DISCECTOMY AND TOTAL DISC REPLACMENT USING DEJAN DISC  Surgeon: Surgeon(s) and Role:     * Oleksandr Mcdonnell MD - Primary     * Sahara Laing PA-C - Assisting  Anesthesia: General   Estimated blood loss: 15 mL  Drains: Brody-Ayala  Specimens: * No specimens in log *  Findings:   Large disc herniation on the right at the C5-6 level..  Complications: None.  Implants: Materials Involved:  Metalic and plastic  Grafts:  None.

## 2019-04-01 NOTE — ANESTHESIA PREPROCEDURE EVALUATION
Anesthesia Pre-Procedure Evaluation    Patient: Osorio Ceron   MRN: [id number] : 1990          Preoperative Diagnosis: C5-6 DISC HERNIATION, RIGHT SIDE; RIGHT C6 RADICULOPATHY    Procedure(s):  C5-6 ANTERIOR DISCECTOMY AND TOTAL DISK REPLACMENT (DEJAN DISK, C-ARM, MIDAS AM 8)    Past Medical History:   Diagnosis Date     Agoraphobia      Anxiety and depression      Migraines      Myofascial pain      Neuropathy      Obesity, Class III, BMI 40-49.9 (morbid obesity) (H)      Sleep apnea     mild, does not use CPAP     Transgender      Past Surgical History:   Procedure Laterality Date     BREAST SURGERY       ENT SURGERY      tonsillectomy     HEAD & NECK SURGERY      teeth extraction x3     MOUTH SURGERY      molar extractions-wisdom teeth     TRANSGENDER MASTECTOMY Bilateral 2015    Procedure: TRANSGENDER MASTECTOMY;  Surgeon: Flavia Costa MD;  Location: [facility] OR       Anesthesia Evaluation     . Pt has had prior anesthetic. Type: General    No history of anesthetic complications          ROS/MED HX    ENT/Pulmonary:  - neg pulmonary ROS   (+)sleep apnea, , . .   (-) tobacco use   Neurologic: Comment: migraines    (+)neuropathy - RUE numbness and tingling, more in the R forearm and posterior thumb, migraines,    (-) seizures and CVA   Cardiovascular:  - neg cardiovascular ROS      (-) hypertension and dyslipidemia   METS/Exercise Tolerance:  4 - Raking leaves, gardening   Hematologic:         Musculoskeletal:   (+) arthritis, , , -       GI/Hepatic:  - neg GI/hepatic ROS      (-) GERD and liver disease   Renal/Genitourinary:  - ROS Renal section negative    (-) renal disease   Endo:  - neg endo ROS   (+) Obesity (Class 3), .   (-) Type II DM and thyroid disease   Psychiatric:     (+) psychiatric history anxiety, depression and other (comment) (Transgender, identifies as male)      Infectious Disease:         Malignancy:         Other:    (+) No chance of pregnancy H/O Chronic Pain,    "                     Physical Exam  Normal systems: cardiovascular, pulmonary and dental    Airway   Mallampati: III  TM distance: >3 FB  Neck ROM: full    Dental     Cardiovascular       Pulmonary             Lab Results   Component Value Date    HCG Negative 04/01/2019       Preop Vitals  BP Readings from Last 3 Encounters:   04/01/19 143/86   01/26/19 (!) 140/102   01/24/19 (!) 143/102    Pulse Readings from Last 3 Encounters:   01/26/19 94   01/24/19 84   12/01/15 70      Resp Readings from Last 3 Encounters:   04/01/19 16   01/26/19 16   01/24/19 18    SpO2 Readings from Last 3 Encounters:   04/01/19 96%   01/26/19 95%   01/24/19 95%      Temp Readings from Last 1 Encounters:   04/01/19 36.6  C (97.8  F) (Oral)    Ht Readings from Last 1 Encounters:   04/01/19 1.6 m (5' 3\")      Wt Readings from Last 1 Encounters:   04/01/19 125.3 kg (276 lb 4.8 oz)    Estimated body mass index is 48.94 kg/m  as calculated from the following:    Height as of this encounter: 1.6 m (5' 3\").    Weight as of this encounter: 125.3 kg (276 lb 4.8 oz).       Anesthesia Plan      History & Physical Review  History and physical reviewed and following examination; no interval change.    ASA Status:  3 .    NPO Status:  > 8 hours    Plan for General and ETT with Intravenous and Propofol induction. Maintenance will be Balanced.    PONV prophylaxis:  Ondansetron (or other 5HT-3), Dexamethasone or Solumedrol and Other (See comment) (Propofol gtt)  Additional equipment: Videolaryngoscope      Postoperative Care  Postoperative pain management:  IV analgesics, Oral pain medications and Multi-modal analgesia.      Consents  Anesthetic plan, risks, benefits and alternatives discussed with:  Patient..                 Jay Monroe MD  "

## 2019-04-01 NOTE — PROGRESS NOTES
Admission medication history interview status for the 4/1/2019  admission is complete. See EPIC admission navigator for prior to admission medications     Medication history source reliability:Good    Medication history interview source(s):Patient    Medication history resources (including written lists, pill bottles, clinic record):Patient emailed in his medication list prior to surgery    Primary pharmacy.[facility]    Additional medication history information not noted on PTA med list :None    Time spent in this activity: 40 minutes    Prior to Admission medications    Medication Sig Last Dose Taking? Auth Provider   acetaminophen (TYLENOL) 500 MG tablet Take 1,000 mg by mouth 2 times daily as needed for mild pain 3/28/2019 at PRN Yes Reported, Patient   cholecalciferol (VITAMIN D3) 5000 units (125 mcg) capsule Take 5,000 Units by mouth daily 3/31/2019 at 1200 Yes Reported, Patient   sertraline (ZOLOFT) 100 MG tablet Take 200 mg by mouth daily (2 x 100 mg = 200 mg dose) 3/31/2019 at 1200 Yes Reported, Patient   testosterone cypionate (DEPOTESTOTERONE CYPIONATE) 200 MG/ML injection Inject 70 mg into the muscle once a week (35 ml) on Shawn 3/31/2019 at Noon Yes Reported, Patient

## 2019-04-01 NOTE — PLAN OF CARE
Pt sleeping in between cares. Minimal pain on surgical area, declined pain med at this time, cold pack applied. Due to void, denies urge to void at this time. Tolerating clears. Soft collar in place. KORIN patent. Neuros intact except for some tingling on (L) fingers and some numbness on tip of tongue. Continue to monitor.

## 2019-04-01 NOTE — ANESTHESIA POSTPROCEDURE EVALUATION
Patient: Osorio Ceron    Procedure(s):  C5-6 ANTERIOR DISCECTOMY AND TOTAL DISC REPLACMENT USING DEJAN DISC    Diagnosis:C5-6 DISC HERNIATION, RIGHT SIDE; RIGHT C6 RADICULOPATHY  Diagnosis Additional Information: No value filed.    Anesthesia Type:  General, ETT    Note:  Anesthesia Post Evaluation    Patient location during evaluation: Bedside  Patient participation: Able to fully participate in evaluation  Level of consciousness: awake and alert  Pain management: adequate  Airway patency: patent  Cardiovascular status: acceptable  Respiratory status: acceptable  Hydration status: acceptable  PONV: none             Last vitals:  Vitals:    04/01/19 1200 04/01/19 1230 04/01/19 1300   BP: 116/76 111/67 106/59   Pulse: 87 83 88   Resp: 18 16 14   Temp:  36.8  C (98.3  F)    SpO2: 96% 96% 95%         Electronically Signed By: Twin Berry MD  April 1, 2019  1:43 PM

## 2019-04-01 NOTE — OR NURSING
Report given to RN on station 55. Patient transferred in stable condition. Neuros intact. Denies pain or nausea. A bag of personal belongings sent with patient and capnography monitor sent along.

## 2019-04-01 NOTE — PROVIDER NOTIFICATION
E mail/Paged Dr Mcdonnell. Patient complains of numbness on tip of tongue. Tingling on left thumb and left two fingers.   Dr Mcdonnell called back. No new orders

## 2019-04-01 NOTE — PROGRESS NOTES
Pt arrived from Recovery room.  A&O, able to make needs known.  Oriented to room/unit.  Intermittent mild pain on surgical area.   Brother at bedside.

## 2019-04-01 NOTE — ANESTHESIA CARE TRANSFER NOTE
Patient: Osorio Ceron    Procedure(s):  C5-6 ANTERIOR DISCECTOMY AND TOTAL DISC REPLACMENT USING DEJAN DISC    Diagnosis: C5-6 DISC HERNIATION, RIGHT SIDE; RIGHT C6 RADICULOPATHY  Diagnosis Additional Information: No value filed.    Anesthesia Type:   General, ETT     Note:  Airway :Face Mask  Patient transferred to:PACU  Comments: Pt exhibits spont resps, all monitors and alarms on in pacu, report given to RN, vss.Handoff Report: Identifed the Patient, Identified the Reponsible Provider, Reviewed the pertinent medical history, Discussed the surgical course, Reviewed Intra-OP anesthesia mangement and issues during anesthesia, Set expectations for post-procedure period and Allowed opportunity for questions and acknowledgement of understanding      Vitals: (Last set prior to Anesthesia Care Transfer)    CRNA VITALS  4/1/2019 1010 - 4/1/2019 1046      4/1/2019             Pulse:  100    SpO2:  95 %    Resp Rate (set):  10                Electronically Signed By: NASRIN Cespedes CRNA  April 1, 2019  10:46 AM

## 2019-04-02 ENCOUNTER — APPOINTMENT (OUTPATIENT)
Dept: PHYSICAL THERAPY | Facility: CLINIC | Age: 29
End: 2019-04-02
Attending: ORTHOPAEDIC SURGERY
Payer: COMMERCIAL

## 2019-04-02 VITALS
WEIGHT: 276.3 LBS | OXYGEN SATURATION: 96 % | TEMPERATURE: 98 F | HEIGHT: 63 IN | SYSTOLIC BLOOD PRESSURE: 115 MMHG | HEART RATE: 105 BPM | DIASTOLIC BLOOD PRESSURE: 75 MMHG | BODY MASS INDEX: 48.96 KG/M2 | RESPIRATION RATE: 20 BRPM

## 2019-04-02 LAB — GLUCOSE BLDC GLUCOMTR-MCNC: 103 MG/DL (ref 70–99)

## 2019-04-02 PROCEDURE — 97110 THERAPEUTIC EXERCISES: CPT | Mod: GP | Performed by: PHYSICAL THERAPIST

## 2019-04-02 PROCEDURE — 97161 PT EVAL LOW COMPLEX 20 MIN: CPT | Mod: GP | Performed by: PHYSICAL THERAPIST

## 2019-04-02 PROCEDURE — 82962 GLUCOSE BLOOD TEST: CPT

## 2019-04-02 PROCEDURE — 97116 GAIT TRAINING THERAPY: CPT | Mod: GP | Performed by: PHYSICAL THERAPIST

## 2019-04-02 PROCEDURE — 25000132 ZZH RX MED GY IP 250 OP 250 PS 637: Performed by: ORTHOPAEDIC SURGERY

## 2019-04-02 PROCEDURE — 97530 THERAPEUTIC ACTIVITIES: CPT | Mod: GP | Performed by: PHYSICAL THERAPIST

## 2019-04-02 RX ORDER — ACETAMINOPHEN 500 MG
1000 TABLET ORAL 2 TIMES DAILY PRN
Status: DISCONTINUED | OUTPATIENT
Start: 2019-04-02 | End: 2019-04-02 | Stop reason: HOSPADM

## 2019-04-02 RX ORDER — HYDROCODONE BITARTRATE AND ACETAMINOPHEN 5; 325 MG/1; MG/1
1-2 TABLET ORAL EVERY 4 HOURS PRN
Qty: 20 TABLET | Refills: 0 | Status: SHIPPED | OUTPATIENT
Start: 2019-04-02

## 2019-04-02 RX ORDER — INDOMETHACIN 25 MG/1
25 CAPSULE ORAL
Qty: 18 CAPSULE | Refills: 0 | Status: SHIPPED | OUTPATIENT
Start: 2019-04-02

## 2019-04-02 RX ADMIN — INDOMETHACIN 25 MG: 25 CAPSULE ORAL at 08:39

## 2019-04-02 RX ADMIN — CHOLECALCIFEROL CAP 125 MCG (5000 UNIT) 5000 UNITS: 125 CAP at 08:39

## 2019-04-02 RX ADMIN — HYDROCODONE BITARTRATE AND ACETAMINOPHEN 1 TABLET: 5; 325 TABLET ORAL at 09:51

## 2019-04-02 RX ADMIN — SERTRALINE HYDROCHLORIDE 200 MG: 100 TABLET ORAL at 08:39

## 2019-04-02 RX ADMIN — INDOMETHACIN 25 MG: 25 CAPSULE ORAL at 12:18

## 2019-04-02 NOTE — PROGRESS NOTES
History:   Minimal c/o.  Tolerating dd3 diet.  Preop symptoms mostly gone. Some residual numbness and tingling on right. Some new tingling on left, improved from yesterday.   Vitals:   B/P: 119/75, T: 97.5, P: 105, R: 22    Intake/Output Summary (Last 24 hours) at 4/2/2019 1357  Last data filed at 4/2/2019 0936  Gross per 24 hour   Intake 550 ml   Output 1840 ml   Net -1290 ml          No results found for: NA No results found for: CHLORIDE No results found for: BUN   No results found for: POTASSIUM No results found for: CO2 No results found for: CR     No results found for: WBC, HGB, HCT, MCV, PLT     Results for orders placed or performed during the hospital encounter of 04/01/19 (from the past 24 hour(s))   Glucose by meter   Result Value Ref Range    Glucose 103 (H) 70 - 99 mg/dL     KORIN output 30 ml 11-7 minimal 7-2, serous.   Dressing:   Dry and intact. Brace/collar intact and well fitted.   Neuro:   Motor: 5/5   Sensation: slightly decreased to light touch in right thumb and lateral forearm, better than preop.     A/P:   Stable POD # 1   Remove KORIN and change dressing.  Discharge summary dictated.  Home today.

## 2019-04-02 NOTE — PROGRESS NOTES
04/02/19 1100   Quick Adds   Type of Visit Initial PT Evaluation   Living Environment   Lives With sibling(s)   Living Arrangements condominium   Home Accessibility stairs to enter home   Number of Stairs, Main Entrance other (see comments)  (26)   Stair Railings, Main Entrance railing on right side (ascending)   Transportation Anticipated car, drives self   Self-Care   Equipment Currently Used at Home none   Activity/Exercise/Self-Care Comment working   Functional Level Prior   Ambulation 0-->independent   Transferring 0-->independent   Toileting 0-->independent   Bathing 0-->independent   Communication 0-->understands/communicates without difficulty   Swallowing 0-->swallows foods/liquids without difficulty   Cognition 0 - no cognition issues reported   Fall history within last six months no   Which of the above functional risks had a recent onset or change? none   General Information   Onset of Illness/Injury or Date of Surgery - Date 04/01/19   Referring Physician Oleksandr Mcdonnell   Patient/Family Goals Statement plan to dc to home with father A x 2 days   Pertinent History of Current Problem (include personal factors and/or comorbidities that impact the POC) s/p cervical surgery   Precautions/Limitations other (see comments);spinal precautions  (sfrt brace, no orders for wearing)   Weight-Bearing Status - LUE full weight-bearing   Weight-Bearing Status - RUE full weight-bearing   Weight-Bearing Status - LLE full weight-bearing   Weight-Bearing Status - RLE full weight-bearing   Cognitive Status Examination   Orientation orientation to person, place and time   Level of Consciousness alert   Follows Commands and Answers Questions 100% of the time   Personal Safety and Judgment intact   Memory intact   Pain Assessment   Patient Currently in Pain No  (with pain meds)   Integumentary/Edema   Integumentary/Edema Comments per surgery   Range of Motion (ROM)   ROM Comment cervical limiteations s/p surgery, UE ROM  "limited due to cervial pain   Strength   Strength Comments LE WFL's, I with sit to stand   Bed Mobility   Bed Mobility Comments bed mob with roll tech with cues and set up   Transfer Skills   Transfer Comments sit to stand with SBA/I   Gait   Gait Comments 20'   General Therapy Interventions   Planned Therapy Interventions bed mobility training;gait training;ROM   Clinical Impression   Criteria for Skilled Therapeutic Intervention yes, treatment indicated   PT Diagnosis difficulty with pain and ROM   Influenced by the following impairments post op pain, decreased ROM   Functional limitations due to impairments impaired functional mob    Clinical Presentation Stable/Uncomplicated   Clinical Presentation Rationale clinical judgement   Clinical Decision Making (Complexity) Low complexity   Therapy Frequency` daily   Predicted Duration of Therapy Intervention (days/wks) 1 day   Anticipated Discharge Disposition Home with Assist   Risk & Benefits of therapy have been explained Yes   Patient, Family & other staff in agreement with plan of care Yes   [facility] TM \"6 Clicks\"   2016, Trustees of [facility], under license to [employer].  All rights reserved.   6 Clicks Short Forms Basic Mobility Inpatient Short Form   [facility]  \"6 Clicks\" V.2 Basic Mobility Inpatient Short Form   1. Turning from your back to your side while in a flat bed without using bedrails? 4 - None   2. Moving from lying on your back to sitting on the side of a flat bed without using bedrails? 4 - None   3. Moving to and from a bed to a chair (including a wheelchair)? 4 - None   4. Standing up from a chair using your arms (e.g., wheelchair, or bedside chair)? 4 - None   5. To walk in hospital room? 4 - None   6. Climbing 3-5 steps with a railing? 4 - None   Basic Mobility Raw Score (Score out of 24.Lower scores equate to lower levels of function) 24   Total Evaluation Time   Total Evaluation Time (Minutes) 10     "

## 2019-04-02 NOTE — PLAN OF CARE
AOx4, up with 1 + GB. PIV removed. CMS intact ex c/o some numbness/tingling in arms, MD aware. VSS, weaned to RA. Voiding adequately. Tolerating DD3 diet. KORIN drain removed, neck dressing changed. Discharge paperwork reviewed with pt verbalized understanding. Pt left floor with transport and personal belongings at 1640.

## 2019-04-02 NOTE — PLAN OF CARE
Discharge Planner PT   Patient plan for discharge: home with A  Current status: PT valentin completed, pt s/p cervical spine surgery.  Pt previously I and lives with brother in condo with 26 steps to enter.   Pt currently meeting all goals, I with mob, understands restrictions and instructed in UE ROM.  Barriers to return to prior living situation: none  Recommendations for discharge: Home with A from Father and brother for household task as needed dur to restrictions  Rationale for recommendations: Pt has met goals for safe discharge to home.       Entered by: BLANCA JIMENEZ 04/02/2019 12:11 PM     Physical Therapy Discharge Summary    Reason for therapy discharge:    All goals and outcomes met, no further needs identified.    Progress towards therapy goal(s). See goals on [facility] Plan in [facility] electronic health record for goal details.  Goals met    Therapy recommendation(s):    Continue home exercise program.

## 2019-04-02 NOTE — PLAN OF CARE
Pt A/OX4, VSS on 2Lo2 oxycodone for pain, CMS intact, neuro intact, reports new numbness in left interior forearm.  A1, possible discharge tomorrow, will continue to monitor

## 2019-04-02 NOTE — DISCHARGE INSTRUCTIONS
INSTRUCTIONS FOR CERVICAL SURGICAL PATIENTS  YURIDIA MCDONNELL MD--[facility]    POSTOPERATIVE INSTRUCTIONS (AFTER SURGERY):     PATIENTS WITH A SOFT COLLAR:    1. The collar must be worn at all times for the 6 weeks after your surgery until your first follow up appointment with Dr. Mcdonnell. You may only remove it to shower however, be sure to avoid any excessive movement of your head and neck.      PATIENTS WITH A CUSTOM NECK BRACE:  1. The brace must be worn at all times for 3 months after surgery. In most cases you can remove it to shower. Dr. Mcdonnell will tell you if you cannot.     2. Check the straps on the brace daily to make sure the lines drawn on the straps are where they belong and the brace is fitting properly.    3. Regarding any questions, concerns or needed adjustments with the brace please call [facility], [phone], and speak directly to the orthotist that fit the brace. If you are having difficulty reaching them please call [facility] at [phone].    4. This brace can be cumbersome to wear we encourage you to practice wearing it with all activities BEFORE your surgery. This will allow you to get used to how it will feel and make any modifications at home or work if need be.     INCISION/WOUND CARE                1.   If you are discharged from the hospital with a dressing over your incision you may remove and replace it 2 days after leaving the hospital.     2.   If you have sutures that dissolve, the incision must be covered when showering for 5 days after surgery. On the 6th day, you may take a shower normally without covering the wound. But do not scrub the wound.     3.  The small pieces of tape over your incision are called Steri-strips, they can be   removed when they are loose or after 2 weeks.                                                                           4.    If you have staples, your incision must remain dry and covered until they are removed 2  weeks after your surgery. Please call Dr. Sathya Tran s Care Coordinator at [phone] to make an appointment to have these removed when you have returned home from the hospital.    5. Please look at your incisions daily and call [phone] immediately if you notice any redness, swelling, drainage, or if you develop a fever greater than 101. If after hours or weekends call [phone] and speak to the on-call physician.    6. Absolutely no bathtubs, hot tubs, swimming in pools or lakes, or any submersion/soaking before the incision is completely healed (no open areas or scabs are present).    7. Due to the location of the incision and surgery you may experience swelling that can alter your swallowing if you are having any difficulty at all please contact us immediately [phone] or after hours or weekends call [phone] and speak to the on-call physician.    POST OPERATIVE ACTIVITY LIMITATIONS    1. Avoid extreme motions of your head and neck which will be limited/controlled by the collar. Prior to surgery we encourage you to stock straws for drinking and soft foods to eat post-operatively as eating/drinking will be temporarily effected by both post surgical swelling and the collar.     2. No lifting over 10 pounds (gallon of milk) above your chest or below your waist.     3. Avoid repetitive twisting or bending i.e. raking, sweeping, shoveling etc.    4. Walking stimulates the healing process. Dr. Mcdonnell wants you to accomplish a minimum of 45 minutes of sustained walking per day for exercise. You are encouraged to walk several times a day and there is no limit on how far you can walk. In the beginning you may only be able to walk 5-15 minutes at a time that is okay just do this a minimum of 4-10x/day.    5. You may remove your white stockings (kendall hose) for   hour twice a day. You may discontinue wearing the stockings when you are not spending any time in bed during the day and are walking at least  hourly.    6. You may begin driving again when you are no longer taking the narcotic pain medication and feel comfortable with being able to navigate amongst other drivers. You must also wear the collar that you have been provided while driving.    7. You may return to work when you are no longer taking the narcotic pain medication. You must observe the 10-pound lifting restriction (nothing below waist or above chest), frequent change of position from sit, stand, and walking and avoid repetitive bending and twisting. You must also wear the collar that you have been provided.    8. Advancement of physical activities will be discussed at each follow up appointment with Dr. Mcdonnell. Physical therapy, if needed, will also be discussed at each appointment.    9. Gentle sexual activity is okay. Be a passive participant.    POST OPERATIVE MEDICATIONS    1. If your surgery INCLUDED A FUSION  DO NOT take Ibuprofen, Motrin, Advil, Aleve or any other anti-inflammatory medication or aspirin products for 3 months after surgery. This also includes any medication taken for chronic inflammatory conditions i.e. Methotrexate, Remicaid, Prednisone etc. unless otherwise instructed. Only Tylenol or Tylenol based medications are okay during this time frame.    2. If your surgery DID NOT include a fusion you may resume any over-the-counter     anti-inflammatories (Advil, Ibuprofen, Naproxen etc.) 3-5 days after the surgery.    3. We recommend three 8 ounce glasses of milk daily and a daily supplement of Vitamin D 2000 i.u./day for fusion healing and bone growth.    4. Poor nutrition can lead to delayed wound healing and constipation. Good sources of lean proteins and fresh fruits and vegetables will help with this.     5. Narcotic pain medications will also cause constipation. Using over the counter stool softeners, drinking plenty of fluids, ambulation, and good nutrition can help prevent this from occurring.     If you have any questions  regarding these instructions please contact Dr. Mcdonnell at   [phone].

## 2019-04-02 NOTE — PLAN OF CARE
Alert and oriented x 4. Neuro intact except numbness on tip of tongue and tingling on left thumb and first two fingers. Dr Mcdonnell notified.  Up with assist of 1. Voids in bathroom. BS active. Intermittent nausea, Zofran given x 1, some relief. Dressing cdi. KORIN patent. Soft collar on. Minimal pain on incision site, declines pain medicine at this time.

## 2019-04-02 NOTE — PROGRESS NOTES
MD Notification    Notified Person: MD    Notified Person Name: Dr Mcdonnell    Notification Date/Time: 4/2/19 0620    Notification Interaction: text    Purpose of Notification: Pt has new onset of numbness in left forearm     Orders Received: awaiting     Comments:

## 2019-04-02 NOTE — DISCHARGE SUMMARY
Admit Date:     2019   Discharge Date:     2019      DIAGNOSIS:  Large disc herniation on the right side at the C5-C6 level with a right C6 radiculopathy.      PROCEDURE THIS ADMISSION:  Anterior cervical diskectomy and total disk replacement with Manas disk.      HISTORY OF PRESENT ILLNESS:  Mr. Ceron is a 28-year-old transgender male who has a several month history of neck, right shoulder, and arm pain.  His symptoms have been refractory to conservative treatment.  His preoperative MRI scan revealed a large disc herniation on the right side at the C5-C6 level that was consistent with his symptoms.  He had a radiculopathy in the right C6 distribution.  Because his symptoms have been refractory to conservative treatment and because of the magnitude of his symptoms, he elected for operative treatment.      HOSPITAL COURSE:  On the day of the procedure.  He was taken to the operating room where the previously mentioned procedure was accomplished without difficulty.  There were no complications.  He was transferred to the regular [facility].  His postoperative course was unremarkable.  He had immediate near complete relief of his preoperative right arm symptoms.  Postoperatively, he did have some new symptoms of numbness and tingling in his left forearm and hand.  These improved substantially by postoperative day #1, although they were still slightly present.  He made good progress in eating, voiding and ambulating.  At the time of discharge, he was ambulating independently without assistance.  He was eating a dysphagia 3 diet without difficulty.  He was comfortable taking predominantly plain Tylenol for pain, but occasional Norco tablet.  He was discharged home on postoperative day #2.        DISCHARGE MEDICATIONS:  He was instructed to resume his preoperative medications includin.  Depo-testosterone.     2.  Plain Tylenol.     3.  Vitamin D3.     4.  Sertraline.     5.  He was  given a prescription for 20 Norco tablets, instructed to take 1 every 4-6 hours as needed for pain.      DISCHARGE FOLLOWUP:  He will follow up in my clinic 1 month from the time of discharge.        DISCHARGE ACTIVITY:  He was instructed to avoid strenuous activities.       DISCHARGE DIET:  He was instructed to follow a dysphagia 3 diet until he was swallowing normally.         YURIDIA MCDONNELL MD             D: 2019   T: 2019   MT: WT      Name:     MARLEN GUO   MRN:      [id number]        Account:        [id number]   :      1990           Admit Date:     2019                                  Discharge Date: 2019      Document: [id number]       cc: Yuridia Mcdonnell MD       [facility]

## 2019-05-01 ENCOUNTER — TRANSFERRED RECORDS (OUTPATIENT)
Dept: HEALTH INFORMATION MANAGEMENT | Facility: CLINIC | Age: 29
End: 2019-05-01

## 2019-07-10 ENCOUNTER — TRANSFERRED RECORDS (OUTPATIENT)
Dept: HEALTH INFORMATION MANAGEMENT | Facility: CLINIC | Age: 29
End: 2019-07-10

## 2019-10-09 ENCOUNTER — TRANSFERRED RECORDS (OUTPATIENT)
Dept: HEALTH INFORMATION MANAGEMENT | Facility: CLINIC | Age: 29
End: 2019-10-09

## 2020-03-10 ENCOUNTER — HEALTH MAINTENANCE LETTER (OUTPATIENT)
Age: 30
End: 2020-03-10

## 2020-12-20 ENCOUNTER — HEALTH MAINTENANCE LETTER (OUTPATIENT)
Age: 30
End: 2020-12-20

## 2021-04-19 ENCOUNTER — IMMUNIZATION (OUTPATIENT)
Dept: NURSING | Facility: CLINIC | Age: 31
End: 2021-04-19
Payer: COMMERCIAL

## 2021-04-19 PROCEDURE — 91300 PR COVID VAC PFIZER DIL RECON 30 MCG/0.3 ML IM: CPT

## 2021-04-19 PROCEDURE — 0001A PR COVID VAC PFIZER DIL RECON 30 MCG/0.3 ML IM: CPT

## 2021-04-24 ENCOUNTER — HEALTH MAINTENANCE LETTER (OUTPATIENT)
Age: 31
End: 2021-04-24

## 2021-05-10 ENCOUNTER — IMMUNIZATION (OUTPATIENT)
Dept: NURSING | Facility: CLINIC | Age: 31
End: 2021-05-10
Attending: FAMILY MEDICINE
Payer: COMMERCIAL

## 2021-05-10 PROCEDURE — 0002A PR COVID VAC PFIZER DIL RECON 30 MCG/0.3 ML IM: CPT

## 2021-05-10 PROCEDURE — 91300 PR COVID VAC PFIZER DIL RECON 30 MCG/0.3 ML IM: CPT

## 2021-09-07 ENCOUNTER — VIRTUAL VISIT (OUTPATIENT)
Dept: BEHAVIORAL HEALTH | Facility: CLINIC | Age: 31
End: 2021-09-07
Payer: COMMERCIAL

## 2021-09-07 DIAGNOSIS — F41.1 GAD (GENERALIZED ANXIETY DISORDER): ICD-10-CM

## 2021-09-07 DIAGNOSIS — F33.2 SEVERE RECURRENT MAJOR DEPRESSION WITHOUT PSYCHOTIC FEATURES (H): Primary | ICD-10-CM

## 2021-09-07 DIAGNOSIS — F90.0 ADHD (ATTENTION DEFICIT HYPERACTIVITY DISORDER), INATTENTIVE TYPE: ICD-10-CM

## 2021-09-07 PROCEDURE — 90834 PSYTX W PT 45 MINUTES: CPT | Mod: 95 | Performed by: SOCIAL WORKER

## 2021-09-07 ASSESSMENT — ANXIETY QUESTIONNAIRES
GAD7 TOTAL SCORE: 21
2. NOT BEING ABLE TO STOP OR CONTROL WORRYING: NEARLY EVERY DAY
GAD7 TOTAL SCORE: 21
4. TROUBLE RELAXING: NEARLY EVERY DAY
GAD7 TOTAL SCORE: 21
8. IF YOU CHECKED OFF ANY PROBLEMS, HOW DIFFICULT HAVE THESE MADE IT FOR YOU TO DO YOUR WORK, TAKE CARE OF THINGS AT HOME, OR GET ALONG WITH OTHER PEOPLE?: EXTREMELY DIFFICULT
7. FEELING AFRAID AS IF SOMETHING AWFUL MIGHT HAPPEN: NEARLY EVERY DAY
3. WORRYING TOO MUCH ABOUT DIFFERENT THINGS: NEARLY EVERY DAY
1. FEELING NERVOUS, ANXIOUS, OR ON EDGE: NEARLY EVERY DAY
7. FEELING AFRAID AS IF SOMETHING AWFUL MIGHT HAPPEN: NEARLY EVERY DAY
6. BECOMING EASILY ANNOYED OR IRRITABLE: NEARLY EVERY DAY
5. BEING SO RESTLESS THAT IT IS HARD TO SIT STILL: NEARLY EVERY DAY

## 2021-09-07 ASSESSMENT — PATIENT HEALTH QUESTIONNAIRE - PHQ9
10. IF YOU CHECKED OFF ANY PROBLEMS, HOW DIFFICULT HAVE THESE PROBLEMS MADE IT FOR YOU TO DO YOUR WORK, TAKE CARE OF THINGS AT HOME, OR GET ALONG WITH OTHER PEOPLE: VERY DIFFICULT
SUM OF ALL RESPONSES TO PHQ QUESTIONS 1-9: 21
SUM OF ALL RESPONSES TO PHQ QUESTIONS 1-9: 21

## 2021-09-08 ASSESSMENT — ANXIETY QUESTIONNAIRES: GAD7 TOTAL SCORE: 21

## 2021-09-08 ASSESSMENT — COLUMBIA-SUICIDE SEVERITY RATING SCALE - C-SSRS
6. HAVE YOU EVER DONE ANYTHING, STARTED TO DO ANYTHING, OR PREPARED TO DO ANYTHING TO END YOUR LIFE?: NO
5. HAVE YOU STARTED TO WORK OUT OR WORKED OUT THE DETAILS OF HOW TO KILL YOURSELF? DO YOU INTEND TO CARRY OUT THIS PLAN?: NO
4. HAVE YOU HAD THESE THOUGHTS AND HAD SOME INTENTION OF ACTING ON THEM?: NO
REASONS FOR IDEATION LIFETIME: MOSTLY TO END OR STOP THE PAIN (YOU COULDN'T GO ON LIVING WITH THE PAIN OR HOW YOU WERE FEELING)
1. IN THE PAST MONTH, HAVE YOU WISHED YOU WERE DEAD OR WISHED YOU COULD GO TO SLEEP AND NOT WAKE UP?: THOUGHT
4. HAVE YOU HAD THESE THOUGHTS AND HAD SOME INTENTION OF ACTING ON THEM?: NO
TOTAL  NUMBER OF INTERRUPTED ATTEMPTS PAST 3 MONTHS: NO
2. HAVE YOU ACTUALLY HAD ANY THOUGHTS OF KILLING YOURSELF LIFETIME?: NO
TOTAL  NUMBER OF ABORTED OR SELF INTERRUPTED ATTEMPTS PAST LIFETIME: NO
TOTAL  NUMBER OF INTERRUPTED ATTEMPTS LIFETIME: NO
ATTEMPT LIFETIME: NO
3. HAVE YOU BEEN THINKING ABOUT HOW YOU MIGHT KILL YOURSELF?: NO
6. HAVE YOU EVER DONE ANYTHING, STARTED TO DO ANYTHING, OR PREPARED TO DO ANYTHING TO END YOUR LIFE?: NO
1. IN THE PAST MONTH, HAVE YOU WISHED YOU WERE DEAD OR WISHED YOU COULD GO TO SLEEP AND NOT WAKE UP?: NO
2. HAVE YOU ACTUALLY HAD ANY THOUGHTS OF KILLING YOURSELF?: NO
1. IN THE PAST MONTH, HAVE YOU WISHED YOU WERE DEAD OR WISHED YOU COULD GO TO SLEEP AND NOT WAKE UP?: YES
5. HAVE YOU STARTED TO WORK OUT OR WORKED OUT THE DETAILS OF HOW TO KILL YOURSELF? DO YOU INTEND TO CARRY OUT THIS PLAN?: NO
ATTEMPT PAST THREE MONTHS: NO
TOTAL  NUMBER OF ABORTED OR SELF INTERRUPTED ATTEMPTS PAST 3 MONTHS: NO

## 2021-09-08 ASSESSMENT — PATIENT HEALTH QUESTIONNAIRE - PHQ9: SUM OF ALL RESPONSES TO PHQ QUESTIONS 1-9: 21
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
## 2021-09-15 ENCOUNTER — VIRTUAL VISIT (OUTPATIENT)
Dept: BEHAVIORAL HEALTH | Facility: CLINIC | Age: 31
End: 2021-09-15
Payer: COMMERCIAL

## 2021-09-15 ENCOUNTER — DOCUMENTATION ONLY (OUTPATIENT)
Dept: BEHAVIORAL HEALTH | Facility: CLINIC | Age: 31
End: 2021-09-15

## 2021-09-15 DIAGNOSIS — F33.2 SEVERE RECURRENT MAJOR DEPRESSION WITHOUT PSYCHOTIC FEATURES (H): Primary | ICD-10-CM

## 2021-09-15 DIAGNOSIS — F41.1 GAD (GENERALIZED ANXIETY DISORDER): ICD-10-CM

## 2021-09-15 DIAGNOSIS — F90.0 ADHD (ATTENTION DEFICIT HYPERACTIVITY DISORDER), INATTENTIVE TYPE: ICD-10-CM

## 2021-09-15 PROCEDURE — 90837 PSYTX W PT 60 MINUTES: CPT | Mod: 95 | Performed by: SOCIAL WORKER
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
## 2021-09-21 ENCOUNTER — VIRTUAL VISIT (OUTPATIENT)
Dept: BEHAVIORAL HEALTH | Facility: CLINIC | Age: 31
End: 2021-09-21
Payer: COMMERCIAL

## 2021-09-21 DIAGNOSIS — F41.1 GAD (GENERALIZED ANXIETY DISORDER): ICD-10-CM

## 2021-09-21 DIAGNOSIS — F90.0 ADHD (ATTENTION DEFICIT HYPERACTIVITY DISORDER), INATTENTIVE TYPE: ICD-10-CM

## 2021-09-21 DIAGNOSIS — F33.2 SEVERE RECURRENT MAJOR DEPRESSION WITHOUT PSYCHOTIC FEATURES (H): Primary | ICD-10-CM

## 2021-09-21 PROCEDURE — 90834 PSYTX W PT 45 MINUTES: CPT | Mod: 95 | Performed by: SOCIAL WORKER
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
## 2021-10-03 ENCOUNTER — HEALTH MAINTENANCE LETTER (OUTPATIENT)
Age: 31
End: 2021-10-03

## 2021-10-12 ENCOUNTER — VIRTUAL VISIT (OUTPATIENT)
Dept: BEHAVIORAL HEALTH | Facility: CLINIC | Age: 31
End: 2021-10-12
Payer: COMMERCIAL

## 2021-10-12 DIAGNOSIS — F33.2 SEVERE RECURRENT MAJOR DEPRESSION WITHOUT PSYCHOTIC FEATURES (H): Primary | ICD-10-CM

## 2021-10-12 DIAGNOSIS — F41.1 GAD (GENERALIZED ANXIETY DISORDER): ICD-10-CM

## 2021-10-12 PROCEDURE — 90791 PSYCH DIAGNOSTIC EVALUATION: CPT | Mod: 95 | Performed by: SOCIAL WORKER
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
## 2021-10-19 ENCOUNTER — VIRTUAL VISIT (OUTPATIENT)
Dept: BEHAVIORAL HEALTH | Facility: CLINIC | Age: 31
End: 2021-10-19
Payer: COMMERCIAL

## 2021-10-19 DIAGNOSIS — F33.2 SEVERE RECURRENT MAJOR DEPRESSION WITHOUT PSYCHOTIC FEATURES (H): Primary | ICD-10-CM

## 2021-10-19 DIAGNOSIS — F41.0 PANIC ATTACK: ICD-10-CM

## 2021-10-19 DIAGNOSIS — F41.1 GAD (GENERALIZED ANXIETY DISORDER): ICD-10-CM

## 2021-10-19 PROCEDURE — 90834 PSYTX W PT 45 MINUTES: CPT | Mod: 95 | Performed by: SOCIAL WORKER
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
## 2021-10-26 ENCOUNTER — VIRTUAL VISIT (OUTPATIENT)
Dept: BEHAVIORAL HEALTH | Facility: CLINIC | Age: 31
End: 2021-10-26
Payer: COMMERCIAL

## 2021-10-26 DIAGNOSIS — F41.0 PANIC ATTACK: ICD-10-CM

## 2021-10-26 DIAGNOSIS — F33.2 SEVERE RECURRENT MAJOR DEPRESSION WITHOUT PSYCHOTIC FEATURES (H): Primary | ICD-10-CM

## 2021-10-26 DIAGNOSIS — F90.0 ADHD (ATTENTION DEFICIT HYPERACTIVITY DISORDER), INATTENTIVE TYPE: ICD-10-CM

## 2021-10-26 PROCEDURE — 90834 PSYTX W PT 45 MINUTES: CPT | Mod: 95 | Performed by: SOCIAL WORKER
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
## 2021-11-02 ENCOUNTER — TELEPHONE (OUTPATIENT)
Dept: BEHAVIORAL HEALTH | Facility: CLINIC | Age: 31
End: 2021-11-02

## 2021-11-02 NOTE — TELEPHONE ENCOUNTER
Patient called back. Patient states he scheduled for Dx Clarification per his therapist.  I explained to patient we would be doing the same DA his therapist completed on 10/21 so referring him back to his therapist as we do not have providers at [facility] who are licensed to do psychological testing.

## 2021-11-02 NOTE — TELEPHONE ENCOUNTER
This Clinician called patient for pre-screening prior to upcoming Diagnostic Assessment appointment. Patient has a DA appointment on 11/10 but recently completed a DA on 10/21.  Left message for patient to inquire about services he is looking for as we do not offer individual therapy nor psychological testing at the [facility].  Also sent message to patient through My Chart.      JAILENE Razo, [facility]  [facility]  Phone: [phone]

## 2021-11-10 ENCOUNTER — VIRTUAL VISIT (OUTPATIENT)
Dept: BEHAVIORAL HEALTH | Facility: CLINIC | Age: 31
End: 2021-11-10
Payer: COMMERCIAL

## 2021-11-10 DIAGNOSIS — F90.0 ADHD (ATTENTION DEFICIT HYPERACTIVITY DISORDER), INATTENTIVE TYPE: ICD-10-CM

## 2021-11-10 DIAGNOSIS — F31.10 BIPOLAR I DISORDER, MOST RECENT EPISODE (OR CURRENT) MANIC (H): Primary | ICD-10-CM

## 2021-11-10 DIAGNOSIS — F41.0 PANIC ATTACK: ICD-10-CM

## 2021-11-10 PROCEDURE — 90834 PSYTX W PT 45 MINUTES: CPT | Mod: 95 | Performed by: SOCIAL WORKER
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
## 2021-11-24 ENCOUNTER — VIRTUAL VISIT (OUTPATIENT)
Dept: BEHAVIORAL HEALTH | Facility: CLINIC | Age: 31
End: 2021-11-24
Payer: COMMERCIAL

## 2021-11-24 DIAGNOSIS — F90.0 ADHD (ATTENTION DEFICIT HYPERACTIVITY DISORDER), INATTENTIVE TYPE: ICD-10-CM

## 2021-11-24 DIAGNOSIS — F41.0 PANIC ATTACK: ICD-10-CM

## 2021-11-24 DIAGNOSIS — F31.10 BIPOLAR I DISORDER, MOST RECENT EPISODE (OR CURRENT) MANIC (H): Primary | ICD-10-CM

## 2021-11-24 PROCEDURE — 90834 PSYTX W PT 45 MINUTES: CPT | Mod: 95 | Performed by: SOCIAL WORKER
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
## 2021-12-08 ENCOUNTER — VIRTUAL VISIT (OUTPATIENT)
Dept: BEHAVIORAL HEALTH | Facility: CLINIC | Age: 31
End: 2021-12-08
Payer: COMMERCIAL

## 2021-12-08 DIAGNOSIS — F41.0 PANIC ATTACK: ICD-10-CM

## 2021-12-08 DIAGNOSIS — F31.10 BIPOLAR I DISORDER, MOST RECENT EPISODE (OR CURRENT) MANIC (H): Primary | ICD-10-CM

## 2021-12-08 DIAGNOSIS — F90.0 ADHD (ATTENTION DEFICIT HYPERACTIVITY DISORDER), INATTENTIVE TYPE: ICD-10-CM

## 2021-12-08 PROCEDURE — 90834 PSYTX W PT 45 MINUTES: CPT | Mod: 95 | Performed by: SOCIAL WORKER
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
## 2021-12-22 ENCOUNTER — VIRTUAL VISIT (OUTPATIENT)
Dept: BEHAVIORAL HEALTH | Facility: CLINIC | Age: 31
End: 2021-12-22
Payer: COMMERCIAL

## 2021-12-22 DIAGNOSIS — F41.0 PANIC ATTACK: ICD-10-CM

## 2021-12-22 DIAGNOSIS — F90.0 ADHD (ATTENTION DEFICIT HYPERACTIVITY DISORDER), INATTENTIVE TYPE: ICD-10-CM

## 2021-12-22 DIAGNOSIS — F31.10 BIPOLAR I DISORDER, MOST RECENT EPISODE (OR CURRENT) MANIC (H): Primary | ICD-10-CM

## 2021-12-22 PROCEDURE — 90834 PSYTX W PT 45 MINUTES: CPT | Mod: 95 | Performed by: SOCIAL WORKER

## 2021-12-22 ASSESSMENT — PATIENT HEALTH QUESTIONNAIRE - PHQ9
SUM OF ALL RESPONSES TO PHQ QUESTIONS 1-9: 11
10. IF YOU CHECKED OFF ANY PROBLEMS, HOW DIFFICULT HAVE THESE PROBLEMS MADE IT FOR YOU TO DO YOUR WORK, TAKE CARE OF THINGS AT HOME, OR GET ALONG WITH OTHER PEOPLE: VERY DIFFICULT
SUM OF ALL RESPONSES TO PHQ QUESTIONS 1-9: 11

## 2021-12-23 ASSESSMENT — PATIENT HEALTH QUESTIONNAIRE - PHQ9: SUM OF ALL RESPONSES TO PHQ QUESTIONS 1-9: 11
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
## 2022-01-18 ASSESSMENT — PATIENT HEALTH QUESTIONNAIRE - PHQ9
SUM OF ALL RESPONSES TO PHQ QUESTIONS 1-9: 10
10. IF YOU CHECKED OFF ANY PROBLEMS, HOW DIFFICULT HAVE THESE PROBLEMS MADE IT FOR YOU TO DO YOUR WORK, TAKE CARE OF THINGS AT HOME, OR GET ALONG WITH OTHER PEOPLE: SOMEWHAT DIFFICULT
SUM OF ALL RESPONSES TO PHQ QUESTIONS 1-9: 10

## 2022-01-19 ENCOUNTER — VIRTUAL VISIT (OUTPATIENT)
Dept: BEHAVIORAL HEALTH | Facility: CLINIC | Age: 32
End: 2022-01-19
Payer: COMMERCIAL

## 2022-01-19 DIAGNOSIS — F31.62 BIPOLAR DISORDER, CURRENT EPISODE MIXED, MODERATE (H): Primary | ICD-10-CM

## 2022-01-19 DIAGNOSIS — F90.0 ADHD (ATTENTION DEFICIT HYPERACTIVITY DISORDER), INATTENTIVE TYPE: ICD-10-CM

## 2022-01-19 PROCEDURE — 90834 PSYTX W PT 45 MINUTES: CPT | Mod: 95 | Performed by: SOCIAL WORKER

## 2022-01-19 ASSESSMENT — PATIENT HEALTH QUESTIONNAIRE - PHQ9: SUM OF ALL RESPONSES TO PHQ QUESTIONS 1-9: 10
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
## 2022-02-02 ENCOUNTER — IMMUNIZATION (OUTPATIENT)
Dept: NURSING | Facility: CLINIC | Age: 32
End: 2022-02-02
Payer: COMMERCIAL

## 2022-02-02 ENCOUNTER — VIRTUAL VISIT (OUTPATIENT)
Dept: BEHAVIORAL HEALTH | Facility: CLINIC | Age: 32
End: 2022-02-02
Payer: COMMERCIAL

## 2022-02-02 DIAGNOSIS — F41.1 GAD (GENERALIZED ANXIETY DISORDER): ICD-10-CM

## 2022-02-02 DIAGNOSIS — F33.2 SEVERE EPISODE OF RECURRENT MAJOR DEPRESSIVE DISORDER, WITHOUT PSYCHOTIC FEATURES (H): Primary | ICD-10-CM

## 2022-02-02 DIAGNOSIS — F90.0 ATTENTION DEFICIT HYPERACTIVITY DISORDER (ADHD), PREDOMINANTLY INATTENTIVE TYPE: ICD-10-CM

## 2022-02-02 PROCEDURE — 91305 COVID-19,PF,PFIZER (12+ YRS): CPT

## 2022-02-02 PROCEDURE — 0054A COVID-19,PF,PFIZER (12+ YRS): CPT

## 2022-02-02 PROCEDURE — 90834 PSYTX W PT 45 MINUTES: CPT | Mod: 95 | Performed by: SOCIAL WORKER
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
## 2022-02-16 ENCOUNTER — VIRTUAL VISIT (OUTPATIENT)
Dept: BEHAVIORAL HEALTH | Facility: CLINIC | Age: 32
End: 2022-02-16
Payer: COMMERCIAL

## 2022-02-16 DIAGNOSIS — F90.0 ATTENTION DEFICIT HYPERACTIVITY DISORDER (ADHD), PREDOMINANTLY INATTENTIVE TYPE: ICD-10-CM

## 2022-02-16 DIAGNOSIS — F41.1 GAD (GENERALIZED ANXIETY DISORDER): ICD-10-CM

## 2022-02-16 DIAGNOSIS — F33.1 MAJOR DEPRESSIVE DISORDER, RECURRENT EPISODE, MODERATE (H): Primary | ICD-10-CM

## 2022-02-16 PROCEDURE — 90834 PSYTX W PT 45 MINUTES: CPT | Mod: 95 | Performed by: SOCIAL WORKER
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
## 2022-02-28 ENCOUNTER — VIRTUAL VISIT (OUTPATIENT)
Dept: PSYCHOLOGY | Facility: CLINIC | Age: 32
End: 2022-02-28
Payer: COMMERCIAL

## 2022-02-28 DIAGNOSIS — F90.2 ATTENTION DEFICIT HYPERACTIVITY DISORDER, COMBINED TYPE, MODERATE: ICD-10-CM

## 2022-02-28 DIAGNOSIS — F41.1 GENERALIZED ANXIETY DISORDER: ICD-10-CM

## 2022-02-28 DIAGNOSIS — F40.10 SOCIAL ANXIETY DISORDER: ICD-10-CM

## 2022-02-28 DIAGNOSIS — F33.1 MAJOR DEPRESSIVE DISORDER, RECURRENT EPISODE, MODERATE (H): Primary | ICD-10-CM

## 2022-02-28 PROCEDURE — 90834 PSYTX W PT 45 MINUTES: CPT | Mod: 95 | Performed by: PSYCHOLOGIST

## 2022-02-28 ASSESSMENT — ANXIETY QUESTIONNAIRES
5. BEING SO RESTLESS THAT IT IS HARD TO SIT STILL: NEARLY EVERY DAY
GAD7 TOTAL SCORE: 11
6. BECOMING EASILY ANNOYED OR IRRITABLE: MORE THAN HALF THE DAYS
7. FEELING AFRAID AS IF SOMETHING AWFUL MIGHT HAPPEN: SEVERAL DAYS
2. NOT BEING ABLE TO STOP OR CONTROL WORRYING: SEVERAL DAYS
1. FEELING NERVOUS, ANXIOUS, OR ON EDGE: MORE THAN HALF THE DAYS
3. WORRYING TOO MUCH ABOUT DIFFERENT THINGS: SEVERAL DAYS

## 2022-02-28 ASSESSMENT — PATIENT HEALTH QUESTIONNAIRE - PHQ9
5. POOR APPETITE OR OVEREATING: SEVERAL DAYS
SUM OF ALL RESPONSES TO PHQ QUESTIONS 1-9: 14

## 2022-02-28 NOTE — PROGRESS NOTES
Progress Note - Initial Visit    Client Name:  Osorio Ceron Date: 2022         Service Type: Individual     Visit Start Time: 11:00am  Visit End Time: 11:44am    Visit #: 1    Attendees: Client attended alone    Service Modality:  Video Visit:      Provider verified identity through the following two step process.  Patient provided:  Patient  and Patient address    Telemedicine Visit: The patient's condition can be safely assessed and treated via synchronous audio and visual telemedicine encounter.      Reason for Telemedicine Visit: Services only offered telehealth    Originating Site (Patient Location): Patient's home    Distant Site (Provider Location): Provider Remote Setting- Home Office    Consent:  The patient/guardian has verbally consented to: the potential risks and benefits of telemedicine (video visit) versus in person care; bill my insurance or make self-payment for services provided; and responsibility for payment of non-covered services.     Patient would like the video invitation sent by:  Send to e-mail at: [email or web address]    Mode of Communication:  Video Conference via Amwell    As the provider I attest to compliance with applicable laws and regulations related to telemedicine.       DATA:   Interactive Complexity: No   Crisis: No     Presenting Concerns/Current Stressors:   Patient presented to session to initiate the general psychological evaluation process.      ASSESSMENT:  Mental Status Assessment:  Appearance:   Appropriate   Eye Contact:   Good   Psychomotor Behavior: Normal   Attitude:   Cooperative   Orientation:   All  Speech   Rate / Production: Normal/ Responsive   Volume:  Normal   Mood:    Normal  Affect:    Appropriate   Thought Content:  Clear   Thought Form:  Logical   Insight:    Good       Safety Issues and Plan for Safety and Risk Management:     [place] Suicide Severity Rating Scale (Lifetime/Recent)  [place] Suicide Severity  Rating (Lifetime/Recent) 1/24/2019 1/26/2019 4/1/2019 9/8/2021 9/21/2021   Wish to be Dead (Lifetime) - - - Yes -   Comments - - - Thoughts i'd be better off dead -   Non-Specific Active Suicidal Thoughts (Lifetime) - - - No -   Most Severe Ideation Rating (Lifetime) - - - 3 -   Most Severe Ideation Description (Lifetime) - - - better off dead -   Frequency (Lifetime) - - - 1 -   Duration (Lifetime) - - - 2 -   Controllability (Lifetime) - - - 2 -   Protective Factors  (Lifetime) - - - 2 -   Reasons for Ideation (Lifetime) - - - 4 -   Q1 Wished to be Dead (Past Month) no no no - -   Q2 Suicidal Thoughts (Past Month) no no no - -   Q6 Suicide Behavior (Lifetime) yes no no - -   RETIRED: 1. Wish to be Dead (Recent) - - - No -   RETIRED: Wish to be Dead Description (Recent) - - - Thought -   RETIRED: 2. Non-Specific Active Suicidal Thoughts (Recent) - - - No -   3. Active Suicidal Ideation with any Methods (Not Plan) Without Intent to Act (Lifetime) - - - No -   RETIRED: 3. Active Suicidal Ideation with any Methods (Not Plan) Without Intent to Act (Recent) - - - No -   RETIRE: 4. Active Suicidal Ideation with Some Intent to Act, Without Specific Plan (Lifetime) - - - No -   4. Active Suicidal Ideation with Some Intent to Act, Without Specific Plan (Recent) - - - No -   RETIRE: 5. Active Suicidal Ideation with Specific Plan and Intent (Lifetime) - - - No -   RETIRED: 5. Active Suicidal Ideation with Specific Plan and Intent (Recent) - - - No -   Most Severe Ideation Rating (Past Month) - - - NA -   Most Severe Ideation Description (Past Month) - - - none -   Frequency (Past Month) - - - NA -   Duration (Past Month) - - - NA -   Controllability (Past Month) - - - NA -   Protective Factors (Past Month) - - - NA -   Reasons for Ideation (Past Month) - - - NA -   Actual Attempt (Lifetime) - - - No -   Actual Attempt (Past 3 Months) - - - No -   Has subject engaged in non-suicidal self-injurious behavior? (Lifetime) - - -  Yes -   Has subject engaged in non-suicidal self-injurious behavior? (Past 3 Months) - - - No -   Interrupted Attempts (Lifetime) - - - No -   Interrupted Attempts (Past 3 Months) - - - No -   Aborted or Self-Interrupted Attempt (Lifetime) - - - No -   Aborted or Self-Interrupted Attempt (Past 3 Months) - - - No -   Preparatory Acts or Behavior (Lifetime) - - - No -   Preparatory Acts or Behavior (Past 3 Months) - - - No -   Most Recent Attempt Actual Lethality Code - - - NA -   Most Lethal Attempt Actual Lethality Code - - - NA -   Initial/First Attempt Actual Lethality Code - - - NA -   1. Wish to be Dead (Past 1 Month) - - - - 1   2. Non-Specific Active Suicidal Thoughts (Past 1 Month) - - - - 0   Calculated C-SSRS Risk Score (Lifetime/Recent) - - - - Moderate Risk     Patient denies current fears or concerns for personal safety.  Patient denies current or recent suicidal ideation or behaviors.  Patient denies current or recent homicidal ideation or behaviors.  Patient denies current or recent self injurious behavior or ideation.  Patient denies other safety concerns.  Safety plan documented in patient's chart on 9/15/2021 with [facility].       Diagnostic Criteria:  F33.1:  A. Five (or more) symptoms have been present during the same 2-week period and represent a change from previous functioning; at least one of the symptoms is either (1) depressed mood or (2) loss of interest or pleasure.   1. Depressed mood.   2. Diminished interest or pleasure in all, or almost all, activities.   3. Significant appetite change.  4. Significant sleep change.   5. Fatigue or loss of energy.   6. Feelings of worthlessness or inappropriate guilt.   7. Diminished ability to think or concentrate, or indecisiveness.   8. Psychomotor agitation or lethargy.   9. Recurrent thoughts of death.   B. The symptoms cause clinically significant distress or impairment in social, occupational, or other important areas of  functioning.  C. The episode is not attributable to the physiological effects of a substance or to another medical condition.  D. The occurence of major depressive episode is not better explained by other thought / psychotic disorders.  E. There has never been a manic episode or hypomanic episode.     F41.1:  A. Excessive anxiety and worry, occurring more days than not for at least 6 months about a number of events or activities.   B. The individual finds it difficult to control the worry.  C. The anxiety and worry are associated with 3 or more of 6 symptoms.  D. The anxiety, worry, or physical symptoms cause clinically significant distress or impairment in social, occupational, or other important areas of functioning.  E. The disturbance is not attributable to the physiological effects of a substance (e.g., a drug of abuse, a medication) or another medical condition (e.g., hyperthyroidism).  F. The disturbance is not better explained by another mental disorder (e.g., anxiety or worry about having panic attacks in panic disorder, negative evaluation in social anxiety disorder [social phobia], contamination or other obsessions in obsessive-compulsive disorder, separation from attachment figures in separation anxiety disorder, reminders of traumatic events in posttraumatic stress disorder, gaining weight in anorexia nervosa, physical complaints in somatic symptom disorder, perceived appearance flaws in body dysmorphic disorder, having a serious illness in illness anxiety disorder, or the content of delusional beliefs in schizophrenia or delusional disorder).    A. Marked fear or anxiety about one or more social situations in which the individual is exposed to possible scrutiny by others. Examples include interactions, being observed, and performing in front of others.  B. The individual fears that here she will act in a way or show anxiety symptoms that will be negatively evaluated.  C. The social situations almost  always provoke fear or anxiety.  D. The social situations are avoided or endured with intense fear or anxiety.  E. The fear or anxiety is out of proportion to the actual threat posed by the social situation and to the sociocultural context.  F. The fear, anxiety, or avoidance is persistent, typically lasting for 6 months or more.  G. The fear, anxiety, or avoidance causes clinically significant distress or impairment in social, occupational, or other important areas of functioning.  H. The fear, anxiety, or avoidance is not attributable to the physiological effects of a substance or another medical condition.  I. The fear, anxiety, or avoidance is not better explained by the symptoms of another mental disorder, such as panic disorder, body dysmorphic disorder, or autism spectrum disorder.  J. If another medical condition is present, the fear, anxiety, or avoidance is clearly unrelated or is excessive.    F90.2:  A. A persistent pattern of inattention and/or hyperactivity-impulsivity that interferes with functioning or development, as characterized by (1) and/or (2):   1. Six or more inattention symptoms that have persisted for at least 6 months to a degree that is inconsistent with developmental level and that negatively impacts directly on social and academic/occupational activities.   2. Six or more hyperactivity and impulsivity symptoms that have persisted for at least 6 months to a degree that is inconsistent with developmental level and that negatively impacts directly on social and academic/occupational activities.  B. Several symptoms (inattentive or hyperactive/impulsive) were present before the age of 12 years.  C. Several symptoms (inattentive or hyperactive/impulsive) present in ?2 settings (eg, at home, school, or work; with friends or relatives; in other activities).  D. There is clear evidence that the symptoms interfere with or reduce the quality of social, academic, or occupational functioning.  E.  Symptoms do not occur exclusively during the course of schizophrenia or another psychotic disorder, and are not better explained by another mental disorder (eg, mood disorder, anxiety disorder, dissociative disorder, personality disorder, substance intoxication, or withdrawal).      DSM5 Diagnoses: (Sustained by DSM5 Criteria Listed Above)  Diagnoses:   1. Major depressive disorder, recurrent episode, moderate (H)    2. Generalized anxiety disorder    3. Social anxiety disorder    4. Attention deficit hyperactivity disorder, combined type, moderate    Rule out bipolar disorder  Rule out trauma disorder    Psychosocial & Contextual Factors: some social support  WHODAS 2.0 (12 item):   WHODAS 2.0 Total Score 9/21/2021 2/25/2022   Total Score 30 25   Total Score MyChart 30 25       PROMIS-10 Scores  Global Mental Health Score: (P) 10  Global Physical Health Score: (P) 15   PROMIS TOTAL - SUBSCORES: (P) 25      Intervention:              Reviewed symptoms and history of presenting concern. A diagnostic intake was recently completed on 10/12/2021 with [facility]. Therefore, another DA will not be completed as a component of the current assessment.   CBT: socratic questioning, positive reinforcement, thought challenging, cognitive reframe  EFT: empathetic attunement, emotion checking, emotion modeling, emotion naming  MI: open ended questions, affirmations, reflections        Attendance Agreement:  Client has not signed the attendance agreement. Discussed expectations at beginning of this first session and patient agreed.       PLAN:  Patient will complete MMPI-2 prior to next session (3/7/2022).    Medical necessity criteria is warranted in order to: Measure a psychological disorder and its severity and functional impairment to determine psychiatric diagnosis when a mental illness is suspected, or to achieve a differential diagnosis from a range of medical/psychological disorders that present with similar  constellations of symptoms (e.g., determination and measurement of anxiety severity and impact in the presence of ongoing asthma or heart disease) and Perform symptom measurement to objectively measure treatment effectiveness and/or determine the need to refer for pharmacological treatment or other medical evaluation (e.g., based on severity and chronicity of symptoms)    Medical necessity for psychological assessment is warranted as a result of the following: (1) A specific clinical question is posed that relates to the condition/symptoms being addressed (2) The question cannot be adequately addressed by clinical interview and/or behavioral observation (3) Results of psychological testing are reasonably expected to provide an answer to the query (4) It is reasonably expected that the testing will provide information leading to a clearer diagnosis and/or guide treatment planning with an expectation of improved clinical outcome.    I acknowledge that, based upon current clinical information, the patient and I have reviewed and discussed issues pertaining to the purpose of therapy/testing, potential therapeutic goals, procedures, risks and benefits and estimated duration of therapy/testing. Issues pertaining to fees and confidentiality were also addressed with the patient indicated understanding. The patient was informed that their symptoms may change during the course of assessment, for better or worse, and this may impact the clinical approach and/or the duration of treatment; the patient understands that it is imperative that I am kept informed of the changes when they occur. I will not be providing any experimental procedures and, if we agree that a change in clinical procedure would be more beneficial, I will obtain specific consent for that procedure or refer you to another provider who has expertise in that area.       Karmen Bauer PsyD,   Clinical Psychologist

## 2022-03-01 ASSESSMENT — ANXIETY QUESTIONNAIRES: GAD7 TOTAL SCORE: 11

## 2022-03-02 ENCOUNTER — VIRTUAL VISIT (OUTPATIENT)
Dept: BEHAVIORAL HEALTH | Facility: CLINIC | Age: 32
End: 2022-03-02
Payer: COMMERCIAL

## 2022-03-02 DIAGNOSIS — F41.1 GAD (GENERALIZED ANXIETY DISORDER): ICD-10-CM

## 2022-03-02 DIAGNOSIS — F31.30 BIPOLAR I DISORDER, MOST RECENT EPISODE DEPRESSED (H): Primary | ICD-10-CM

## 2022-03-02 PROCEDURE — 90834 PSYTX W PT 45 MINUTES: CPT | Mod: 95 | Performed by: SOCIAL WORKER
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
## 2022-03-07 ENCOUNTER — VIRTUAL VISIT (OUTPATIENT)
Dept: PSYCHOLOGY | Facility: CLINIC | Age: 32
End: 2022-03-07
Payer: COMMERCIAL

## 2022-03-07 DIAGNOSIS — F33.1 MAJOR DEPRESSIVE DISORDER, RECURRENT EPISODE, MODERATE (H): Primary | ICD-10-CM

## 2022-03-07 DIAGNOSIS — F41.1 GENERALIZED ANXIETY DISORDER: ICD-10-CM

## 2022-03-07 DIAGNOSIS — F40.10 SOCIAL ANXIETY DISORDER: ICD-10-CM

## 2022-03-07 DIAGNOSIS — F90.2 ATTENTION DEFICIT HYPERACTIVITY DISORDER, COMBINED TYPE, MODERATE: ICD-10-CM

## 2022-03-07 PROCEDURE — 90834 PSYTX W PT 45 MINUTES: CPT | Mod: 95 | Performed by: PSYCHOLOGIST

## 2022-03-07 NOTE — PROGRESS NOTES
[facility]    Progress Note    Patient Name: Osorio Ceron  Date: 2022       Service Type:  Second general psychological testing appointment      Session Start Time: 5:00pm  Session End Time:  5:39pm     Session Length: 39 minutes    Session #: 2    Attendees: Client attended alone    Service Modality:  Video Visit:      Provider verified identity through the following two step process.  Patient provided:  Patient  and Patient address    Telemedicine Visit: The patient's condition can be safely assessed and treated via synchronous audio and visual telemedicine encounter.      Reason for Telemedicine Visit: Services only offered telehealth    Originating Site (Patient Location): Patient's home    Distant Site (Provider Location): Provider Remote Setting- Home Office    Consent:  The patient/guardian has verbally consented to: the potential risks and benefits of telemedicine (video visit) versus in person care; bill my insurance or make self-payment for services provided; and responsibility for payment of non-covered services.     Patient would like the video invitation sent by:  Send to e-mail at: [email or web address]    Mode of Communication:  Video Conference via [facility]    As the provider I attest to compliance with applicable laws and regulations related to telemedicine.      DATA  Interactive Complexity: No  Crisis: No       Progress Since Last Session (Related to Symptoms / Goals / Homework):   Symptoms: Stable, no change.     Homework: Patient completed MMPI-2.     Episode of Care Goals: Satisfactory progress - ACTION (Actively working towards change); Intervened by reinforcing change plan / affirming steps taken     Current / Ongoing Stressors and Concerns:   Discussed manifestations of attention and concentration problems in various areas of his life.                  Treatment Objective(s) Addressed in This Session:           Continued with testing process. Discussed further background information and trauma in family life while growing up. PTSD ruled out, patient does not meet criteria.                  Intervention:              Continued information gathering specific to ADHD and mood symptoms. Ended session by discussing feedback process.   CBT: socratic questioning, positive reinforcement  EFT: empathetic attunement, emotion checking  MI: open ended questions, affirmations, reflections    Assessments completed prior to visit:  The following assessments were completed by patient for this visit:  PHQ9:   PHQ-9 SCORE 9/7/2021 9/21/2021 12/22/2021 1/18/2022 2/17/2022 2/28/2022   PHQ-9 Total Score MyChart 21 (Severe depression) 23 (Severe depression) 11 (Moderate depression) 10 (Moderate depression) 10 (Moderate depression) -   PHQ-9 Total Score - - - - - 14   Some encounter information is confidential and restricted. Go to Review Flowsheets activity to see all data.     GAD7:   NIKITA-7 SCORE 9/7/2021 9/21/2021 2/17/2022 2/28/2022   Total Score 21 (severe anxiety) 14 (moderate anxiety) 7 (mild anxiety) -   Total Score - - - 11   Some encounter information is confidential and restricted. Go to Review Flowsheets activity to see all data.        ASSESSMENT: Current Emotional / Mental Status (status of significant symptoms):   Risk status (Self / Other harm or suicidal ideation)   Patient denies current fears or concerns for personal safety.   Patient denies current or recent suicidal ideation or behaviors.   Patient denies current or recent homicidal ideation or behaviors.   Patient denies current or recent self injurious behavior or ideation.   Patient denies other safety concerns.   Patient reports there has been no change in risk factors since their last session.     Patient reports there has been no change in protective factors since their last session.     Safety plan documented in patient's chart on 9/15/2021 with Bety Anne  LICSW.     Appearance:   Appropriate    Eye Contact:   Good    Psychomotor Behavior: Normal    Attitude:   Cooperative    Orientation:   All   Speech    Rate / Production: Normal     Volume:  Normal    Mood:    Normal   Affect:    Appropriate    Thought Content:  Clear    Thought Form:  Coherent  Logical    Insight:    Good      Medication Review:   No changes to current psychiatric medication(s)     Medication Compliance:   Yes     Changes in Health Issues:   None reported     Chemical Use Review:   Substance Use: Chemical use reviewed, no active concerns identified      Nicotine Use: No current tobacco use.      Diagnosis:  1. Major depressive disorder, recurrent episode, moderate (H)    2. Generalized anxiety disorder    3. Social anxiety disorder    4. Attention deficit hyperactivity disorder, combined type, moderate    Rule out bipolar disorder    PLAN:   Feedback session scheduled for 3/30/2022.      Karmen Bauer PsyD, LP  Clinical Psychologist

## 2022-03-16 ENCOUNTER — VIRTUAL VISIT (OUTPATIENT)
Dept: BEHAVIORAL HEALTH | Facility: CLINIC | Age: 32
End: 2022-03-16
Payer: COMMERCIAL

## 2022-03-16 DIAGNOSIS — F31.30 BIPOLAR I DISORDER, MOST RECENT EPISODE DEPRESSED (H): Primary | ICD-10-CM

## 2022-03-16 DIAGNOSIS — F41.1 GAD (GENERALIZED ANXIETY DISORDER): ICD-10-CM

## 2022-03-16 DIAGNOSIS — F90.0 ADHD (ATTENTION DEFICIT HYPERACTIVITY DISORDER), INATTENTIVE TYPE: ICD-10-CM

## 2022-03-16 PROCEDURE — 90834 PSYTX W PT 45 MINUTES: CPT | Mod: 95 | Performed by: SOCIAL WORKER

## 2022-03-20 ASSESSMENT — COLUMBIA-SUICIDE SEVERITY RATING SCALE - C-SSRS
1. SINCE LAST CONTACT, HAVE YOU WISHED YOU WERE DEAD OR WISHED YOU COULD GO TO SLEEP AND NOT WAKE UP?: NO
2. HAVE YOU ACTUALLY HAD ANY THOUGHTS OF KILLING YOURSELF?: NO
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
## 2022-03-29 ENCOUNTER — VIRTUAL VISIT (OUTPATIENT)
Dept: BEHAVIORAL HEALTH | Facility: CLINIC | Age: 32
End: 2022-03-29
Payer: COMMERCIAL

## 2022-03-29 DIAGNOSIS — F41.1 GAD (GENERALIZED ANXIETY DISORDER): ICD-10-CM

## 2022-03-29 DIAGNOSIS — F90.0 ADHD (ATTENTION DEFICIT HYPERACTIVITY DISORDER), INATTENTIVE TYPE: ICD-10-CM

## 2022-03-29 DIAGNOSIS — F33.1 MAJOR DEPRESSIVE DISORDER, RECURRENT EPISODE, MODERATE (H): Primary | ICD-10-CM

## 2022-03-29 PROCEDURE — 90834 PSYTX W PT 45 MINUTES: CPT | Mod: 95 | Performed by: SOCIAL WORKER

## 2022-03-30 ENCOUNTER — VIRTUAL VISIT (OUTPATIENT)
Dept: PSYCHOLOGY | Facility: CLINIC | Age: 32
End: 2022-03-30
Payer: COMMERCIAL

## 2022-03-30 DIAGNOSIS — F90.2 ATTENTION DEFICIT HYPERACTIVITY DISORDER (ADHD), COMBINED TYPE: ICD-10-CM

## 2022-03-30 DIAGNOSIS — F41.1 GENERALIZED ANXIETY DISORDER: ICD-10-CM

## 2022-03-30 DIAGNOSIS — F33.1 MAJOR DEPRESSIVE DISORDER, RECURRENT EPISODE, MODERATE (H): Primary | ICD-10-CM

## 2022-03-30 PROCEDURE — 96130 PSYCL TST EVAL PHYS/QHP 1ST: CPT | Mod: 95 | Performed by: PSYCHOLOGIST

## 2022-03-30 PROCEDURE — 96131 PSYCL TST EVAL PHYS/QHP EA: CPT | Mod: 95 | Performed by: PSYCHOLOGIST
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
## 2022-03-30 NOTE — PROGRESS NOTES
[facility]   Mental Health & Addiction Services     Progress Note - General Psychological Testing Feedback Session     Patient Name: Osorio Ceron  Date: 2022       Service Type:  Individual       Session Start Time: 4:00pm  Session End Time:  4:26pm     Session Length: 26 minutes    Session #: 3    Attendees: Patient attended alone    Service Modality: Video Visit:      Provider verified identity through the following two step process.  Patient provided:  Patient  and Patient address    Telemedicine Visit: The patient's condition can be safely assessed and treated via synchronous audio and visual telemedicine encounter.      Reason for Telemedicine Visit: Services only offered telehealth    Originating Site (Patient Location): Patient's home    Distant Site (Provider Location): Provider Remote Setting- Home Office    Consent:  The patient/guardian has verbally consented to: the potential risks and benefits of telemedicine (video visit) versus in person care; bill my insurance or make self-payment for services provided; and responsibility for payment of non-covered services.     Patient would like the video invitation sent by:  Send to e-mail at: [email or web address]    Mode of Communication:  Video Conference via well    As the provider I attest to compliance with applicable laws and regulations related to telemedicine.      PHQ 2022   PHQ-9 Total Score 10 10 14   Q9: Thoughts of better off dead/self-harm past 2 weeks Not at all Not at all Not at all   F/U: Thoughts of suicide or self-harm - - -   F/U: Self harm-plan - - -   F/U: Self-harm action - - -   F/U: Safety concerns - - -       NIKITA-7 SCORE 2021   Total Score 14 (moderate anxiety) 7 (mild anxiety) -   Total Score 14 7 11         DATA      Progress Since Last Session (Related to Symptoms / Goals / Homework):   Symptoms:  Stable.    Homework: Completed.      Treatment Objective(s) Addressed in This Session:   Provided feedback on the general psych evaluation. Reviewed test results in depth. Plan of care and recommendations were discussed based on testing data. See full report attached on secondary note in this encounter.     Intervention:   Provided feedback to patient regarding testing results, diagnoses, and treatment recommendations. Test results are consistent with depression and anxiety. Symptoms are not better explained by bipolar disorder. Personalized suggestions regarding symptoms were offered. Patient had the opportunity to ask questions; he expressed understanding.        ASSESSMENT: Current Emotional / Mental Status (status of significant symptoms):   Risk status (Self / Other harm or suicidal ideation)   Patient denies current fears or concerns for personal safety.   Patient denies current or recent suicidal ideation or behaviors.   Patient denies current or recent homicidal ideation or behaviors.   Patient denies current or recent self injurious behavior or ideation.   Patient denies other safety concerns.   Patient reports there has been no change in risk factors since their last session.     Patient reports there has been no change in protective factors since their last session.     Recommended that patient call 911 or go to the local ED should there be a change in any of these risk factors.     Appearance:   Appropriate    Eye Contact:   Good    Psychomotor Behavior: Normal    Attitude:   Cooperative    Orientation:   All   Speech    Rate / Production: Normal     Volume:  Normal    Mood:    Normal   Affect:    Appropriate    Thought Content:  Clear    Thought Form:  Coherent  Logical    Insight:    Good      Medication Review:   No changes to current psychiatric medication(s)     Medication Compliance:   Yes     Changes in Health Issues:   None reported     Chemical Use Review:   Substance Use: Chemical use reviewed,  no active concerns identified      Nicotine Use: No current tobacco use.      Diagnosis:  1. Major depressive disorder, recurrent episode, moderate (H)    2. Generalized anxiety disorder    3. Attention deficit hyperactivity disorder (ADHD), combined type          PLAN:   Recommendations are outlined in full evaluation report (attached to this encounter).   Patient indicated understanding and will contact the clinic if there are further questions.    Parts of this documentation may have been completed using dictation software. Potential errors may result and are unintentional.       Karmen Bauer PsyD, LP  Clinical Psychologist         Psychological Testing Services Summary       Testing Evaluation Services Base: 66509  (first 60 mins) Add-on: 08204  (each addtl 60 mins)   Record Review and Clarify Referral Question   10:50am-11:00am on 2/28/2022 10 minutes   Clinical Decision Making/Battery Modification   4:45pm-5:00pm on 3/7/2022 15 minutes   Integration/Report Generation   7:30am-8:15am on 3/29/2022 (MMPI-2)  12:00pm-1:00pm on 3/29/2022 (Final Report)   45 minutes  60 minutes   Interactive Feedback Session   4:00pm-4:26pm on 3/30/2022 26 minutes   Post-Service Work   4:26pm-4:41pm on 3/30/2022 15 minutes   Total Time: 145 minutes   Total Units: 1 1       Diagnoses:   F33.1 Major Depressive Disorder, recurrent episode, moderate  F41.1 Generalized Anxiety Disorder  (History of) F90.2 ADHD, combined type, moderate

## 2022-04-12 ENCOUNTER — VIRTUAL VISIT (OUTPATIENT)
Dept: BEHAVIORAL HEALTH | Facility: CLINIC | Age: 32
End: 2022-04-12
Payer: COMMERCIAL

## 2022-04-12 DIAGNOSIS — F33.1 MAJOR DEPRESSIVE DISORDER, RECURRENT EPISODE, MODERATE (H): Primary | ICD-10-CM

## 2022-04-12 DIAGNOSIS — F90.0 ADHD (ATTENTION DEFICIT HYPERACTIVITY DISORDER), INATTENTIVE TYPE: ICD-10-CM

## 2022-04-12 DIAGNOSIS — F41.1 GAD (GENERALIZED ANXIETY DISORDER): ICD-10-CM

## 2022-04-12 PROCEDURE — 90834 PSYTX W PT 45 MINUTES: CPT | Mod: 95 | Performed by: SOCIAL WORKER

## 2022-04-18 ENCOUNTER — DOCUMENTATION ONLY (OUTPATIENT)
Dept: BEHAVIORAL HEALTH | Facility: CLINIC | Age: 32
End: 2022-04-18
Payer: COMMERCIAL
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
## 2022-05-15 ENCOUNTER — HEALTH MAINTENANCE LETTER (OUTPATIENT)
Age: 32
End: 2022-05-15

## 2022-06-05 ENCOUNTER — HOSPITAL ENCOUNTER (EMERGENCY)
Facility: CLINIC | Age: 32
Discharge: HOME | End: 2022-06-05
Attending: EMERGENCY MEDICINE | Admitting: EMERGENCY MEDICINE
Payer: COMMERCIAL

## 2022-06-05 VITALS
BODY MASS INDEX: 53.78 KG/M2 | TEMPERATURE: 98.7 F | SYSTOLIC BLOOD PRESSURE: 154 MMHG | RESPIRATION RATE: 20 BRPM | OXYGEN SATURATION: 97 % | HEART RATE: 100 BPM | WEIGHT: 315 LBS | HEIGHT: 64 IN | DIASTOLIC BLOOD PRESSURE: 82 MMHG

## 2022-06-05 DIAGNOSIS — S61.209A AVULSION OF FINGER TIP, INITIAL ENCOUNTER: ICD-10-CM

## 2022-06-05 PROCEDURE — 99283 EMERGENCY DEPT VISIT LOW MDM: CPT | Mod: 25

## 2022-06-05 PROCEDURE — 250N000011 HC RX IP 250 OP 636: Performed by: EMERGENCY MEDICINE

## 2022-06-05 PROCEDURE — 12001 RPR S/N/AX/GEN/TRNK 2.5CM/<: CPT

## 2022-06-05 PROCEDURE — 90471 IMMUNIZATION ADMIN: CPT | Performed by: EMERGENCY MEDICINE

## 2022-06-05 PROCEDURE — 90715 TDAP VACCINE 7 YRS/> IM: CPT | Performed by: EMERGENCY MEDICINE

## 2022-06-05 RX ADMIN — CLOSTRIDIUM TETANI TOXOID ANTIGEN (FORMALDEHYDE INACTIVATED), CORYNEBACTERIUM DIPHTHERIAE TOXOID ANTIGEN (FORMALDEHYDE INACTIVATED), BORDETELLA PERTUSSIS TOXOID ANTIGEN (GLUTARALDEHYDE INACTIVATED), BORDETELLA PERTUSSIS FILAMENTOUS HEMAGGLUTININ ANTIGEN (FORMALDEHYDE INACTIVATED), BORDETELLA PERTUSSIS PERTACTIN ANTIGEN, AND BORDETELLA PERTUSSIS FIMBRIAE 2/3 ANTIGEN 0.5 ML: 5; 2; 2.5; 5; 3; 5 INJECTION, SUSPENSION INTRAMUSCULAR at 10:46

## 2022-06-05 ASSESSMENT — ENCOUNTER SYMPTOMS
WOUND: 1
NERVOUS/ANXIOUS: 1

## 2022-06-05 NOTE — ED TRIAGE NOTES
Pt presents with laceration present to the R thumb after cutting his finger on a slicing machine. CMS intact, bleeding controlled.      Triage Assessment     Row Name 06/05/22 0805       Triage Assessment (Adult)    Airway WDL WDL       Respiratory WDL    Respiratory WDL WDL       Skin Circulation/Temperature WDL    Skin Circulation/Temperature WDL WDL       Cardiac WDL    Cardiac WDL WDL       Peripheral/Neurovascular WDL    Peripheral Neurovascular WDL WDL       Cognitive/Neuro/Behavioral WDL    Cognitive/Neuro/Behavioral WDL WDL

## 2022-06-05 NOTE — ED NOTES
Bed: ED28  Expected date:   Expected time:   Means of arrival:   Comments:  Can you plz come get them. Give me you level 4's .  caridad

## 2022-06-05 NOTE — ED PROVIDER NOTES
"  History   Chief Complaint:  Laceration     The history is provided by the patient.      Osorio Ceron is a 32 year old adult with history of being transgender who presents alone for a laceration. Per [facility], Osorio's last TDap was on 12/12/2014. Today, while attempting to make a soup from [employer], the patient sliced the tip of the thumb with a \"slicer\". Osorio's father recommended to get checked out in the Emergency Department. Osorio is right handed and mentions often typing for work.    Review of Systems   Skin: Positive for wound.   Psychiatric/Behavioral: The patient is nervous/anxious.      Allergies:  The patient denies having any allergies.    Medications:  cholecalciferol   HYDROcodone-acetaminophen  indomethacin   sertraline  testosterone cypionate     Past Medical History:     Agoraphobia   Anxiety and depression   Migraines   Myofascial pain   Neuropathy   Obesity, Class III   Sleep apnea   Transgender     Past Surgical History:    Breast surgery  Tonsillectomy  Teeth extraction (x3)  [place] teeth extraction   Replace disk cervical anterior   Transgender mastectomy      Family History:    Prostate cancer - Father    Social History:  The patient is not a smoker.  The patient presents alone.    Physical Exam     Patient Vitals for the past 24 hrs:   BP Temp Temp src Pulse Resp SpO2 Height Weight   06/05/22 0848 (!) 154/82 -- -- -- -- -- -- --   06/05/22 0847 -- 98.7  F (37.1  C) Temporal 100 20 97 % 1.626 m (5' 4\") 149.7 kg (330 lb)     Physical Exam    GENERAL: well developed, pleasant  HEAD: atraumatic  EYES: pupils reactive, extraocular muscles intact, conjunctivae normal  ENT:  mucus membranes moist  NECK:  trachea midline, normal range of motion  RESPIRATORY: No tachypnea  CVS: Well-perfused skin  ABDOMEN: No distention  MUSCULOSKELETAL: no deformities  SKIN: Skin avulsion to the tip of the finger not involving the nailbed on the right thumb  NEURO: GCS 15, cranial nerves intact, " alert and oriented x3  PSYCH:  Mood/affect normal    Emergency Department Course     Laboratory:  Labs Ordered and Resulted from Time of ED Arrival to Time of ED Departure - No data to display     Emergency Department Course:     Reviewed:  I reviewed nursing notes, vitals, past medical history, Care Everywhere and [facility]    Assessments:  1012 I obtained history and examined the patient as noted above.   1018 I rechecked the patient and explained findings.    Interventions:  1046 Tdap (tetanus-diphtheria-acell pertussis) (ADACEL) injection 0.5 mL, IM    Disposition:  The patient was discharged to home.     Impression & Plan     CMS Diagnoses: None.    Medical Decision Making:  Patient presents with avulsion to the tip of her finger.  It was cleansed and Surgicel applied to help stop the bleeding.  Does not require suturing.  Tetanus is updated.  No evidence of nailbed or tendon injury.    Diagnosis:    ICD-10-CM    1. Avulsion of finger tip, initial encounter  S61.209A      Scribe Disclosure:  I, Baldo Arce, am serving as a scribe at 10:34 AM on 6/5/2022 to document services personally performed by Leo Funez MD based on my observations and the provider's statements to me.     Leo Funez MD  06/05/22 2142

## 2022-09-11 ENCOUNTER — HEALTH MAINTENANCE LETTER (OUTPATIENT)
Age: 32
End: 2022-09-11

## 2023-02-22 ASSESSMENT — ANXIETY QUESTIONNAIRES
GAD7 TOTAL SCORE: 21
7. FEELING AFRAID AS IF SOMETHING AWFUL MIGHT HAPPEN: NEARLY EVERY DAY
IF YOU CHECKED OFF ANY PROBLEMS ON THIS QUESTIONNAIRE, HOW DIFFICULT HAVE THESE PROBLEMS MADE IT FOR YOU TO DO YOUR WORK, TAKE CARE OF THINGS AT HOME, OR GET ALONG WITH OTHER PEOPLE: EXTREMELY DIFFICULT
2. NOT BEING ABLE TO STOP OR CONTROL WORRYING: NEARLY EVERY DAY
7. FEELING AFRAID AS IF SOMETHING AWFUL MIGHT HAPPEN: NEARLY EVERY DAY
4. TROUBLE RELAXING: NEARLY EVERY DAY
1. FEELING NERVOUS, ANXIOUS, OR ON EDGE: NEARLY EVERY DAY
GAD7 TOTAL SCORE: 21
GAD7 TOTAL SCORE: 21
8. IF YOU CHECKED OFF ANY PROBLEMS, HOW DIFFICULT HAVE THESE MADE IT FOR YOU TO DO YOUR WORK, TAKE CARE OF THINGS AT HOME, OR GET ALONG WITH OTHER PEOPLE?: EXTREMELY DIFFICULT
6. BECOMING EASILY ANNOYED OR IRRITABLE: NEARLY EVERY DAY
5. BEING SO RESTLESS THAT IT IS HARD TO SIT STILL: NEARLY EVERY DAY
3. WORRYING TOO MUCH ABOUT DIFFERENT THINGS: NEARLY EVERY DAY

## 2023-03-01 ENCOUNTER — VIRTUAL VISIT (OUTPATIENT)
Dept: BEHAVIORAL HEALTH | Facility: CLINIC | Age: 33
End: 2023-03-01
Payer: COMMERCIAL

## 2023-03-01 DIAGNOSIS — F33.1 MAJOR DEPRESSIVE DISORDER, RECURRENT EPISODE, MODERATE (H): Primary | ICD-10-CM

## 2023-03-01 DIAGNOSIS — F41.1 GAD (GENERALIZED ANXIETY DISORDER): ICD-10-CM

## 2023-03-01 PROCEDURE — 90834 PSYTX W PT 45 MINUTES: CPT | Mod: VID | Performed by: SOCIAL WORKER

## 2023-03-01 ASSESSMENT — PATIENT HEALTH QUESTIONNAIRE - PHQ9
SUM OF ALL RESPONSES TO PHQ QUESTIONS 1-9: 21
10. IF YOU CHECKED OFF ANY PROBLEMS, HOW DIFFICULT HAVE THESE PROBLEMS MADE IT FOR YOU TO DO YOUR WORK, TAKE CARE OF THINGS AT HOME, OR GET ALONG WITH OTHER PEOPLE: VERY DIFFICULT
SUM OF ALL RESPONSES TO PHQ QUESTIONS 1-9: 21
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
## 2023-04-10 ENCOUNTER — VIRTUAL VISIT (OUTPATIENT)
Dept: BEHAVIORAL HEALTH | Facility: CLINIC | Age: 33
End: 2023-04-10
Payer: COMMERCIAL

## 2023-04-10 DIAGNOSIS — F33.1 MAJOR DEPRESSIVE DISORDER, RECURRENT EPISODE, MODERATE (H): Primary | ICD-10-CM

## 2023-04-10 DIAGNOSIS — F41.1 GAD (GENERALIZED ANXIETY DISORDER): ICD-10-CM

## 2023-04-10 PROCEDURE — 90791 PSYCH DIAGNOSTIC EVALUATION: CPT | Mod: VID | Performed by: SOCIAL WORKER

## 2023-04-11 NOTE — PROGRESS NOTES
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 __verbal review on 4/10/23_______________________________________________________________        Yulissa Lozoya LICSW  4/10/23

## 2023-05-01 ASSESSMENT — ANXIETY QUESTIONNAIRES
6. BECOMING EASILY ANNOYED OR IRRITABLE: SEVERAL DAYS
8. IF YOU CHECKED OFF ANY PROBLEMS, HOW DIFFICULT HAVE THESE MADE IT FOR YOU TO DO YOUR WORK, TAKE CARE OF THINGS AT HOME, OR GET ALONG WITH OTHER PEOPLE?: SOMEWHAT DIFFICULT
4. TROUBLE RELAXING: SEVERAL DAYS
1. FEELING NERVOUS, ANXIOUS, OR ON EDGE: MORE THAN HALF THE DAYS
2. NOT BEING ABLE TO STOP OR CONTROL WORRYING: MORE THAN HALF THE DAYS
7. FEELING AFRAID AS IF SOMETHING AWFUL MIGHT HAPPEN: SEVERAL DAYS
3. WORRYING TOO MUCH ABOUT DIFFERENT THINGS: MORE THAN HALF THE DAYS
GAD7 TOTAL SCORE: 10
IF YOU CHECKED OFF ANY PROBLEMS ON THIS QUESTIONNAIRE, HOW DIFFICULT HAVE THESE PROBLEMS MADE IT FOR YOU TO DO YOUR WORK, TAKE CARE OF THINGS AT HOME, OR GET ALONG WITH OTHER PEOPLE: SOMEWHAT DIFFICULT
GAD7 TOTAL SCORE: 10
5. BEING SO RESTLESS THAT IT IS HARD TO SIT STILL: SEVERAL DAYS
GAD7 TOTAL SCORE: 10
7. FEELING AFRAID AS IF SOMETHING AWFUL MIGHT HAPPEN: SEVERAL DAYS

## 2023-05-08 ENCOUNTER — VIRTUAL VISIT (OUTPATIENT)
Dept: BEHAVIORAL HEALTH | Facility: CLINIC | Age: 33
End: 2023-05-08
Payer: COMMERCIAL

## 2023-05-08 DIAGNOSIS — F90.0 ADHD (ATTENTION DEFICIT HYPERACTIVITY DISORDER), INATTENTIVE TYPE: Primary | ICD-10-CM

## 2023-05-08 PROCEDURE — 90834 PSYTX W PT 45 MINUTES: CPT | Mod: VID | Performed by: SOCIAL WORKER

## 2023-05-08 ASSESSMENT — PATIENT HEALTH QUESTIONNAIRE - PHQ9
10. IF YOU CHECKED OFF ANY PROBLEMS, HOW DIFFICULT HAVE THESE PROBLEMS MADE IT FOR YOU TO DO YOUR WORK, TAKE CARE OF THINGS AT HOME, OR GET ALONG WITH OTHER PEOPLE: SOMEWHAT DIFFICULT
SUM OF ALL RESPONSES TO PHQ QUESTIONS 1-9: 8
SUM OF ALL RESPONSES TO PHQ QUESTIONS 1-9: 8

## 2023-05-09 NOTE — PROGRESS NOTES
"[facility]                                     Progress Note    Patient Name: Osorio Ceron  Date: 5/8/23         Service Type: Individual      Session Start Time: 1800  Session End Time: 1852     Session Length: 52    Session #: 4    Attendees: Client    Service Modality:  Video Visit:      Provider verified identity through the following two step process.  Patient provided:  Patient is known previously to provider    Telemedicine Visit: The patient's condition can be safely assessed and treated via synchronous audio and visual telemedicine encounter.      Reason for Telemedicine Visit: Patient has requested telehealth visit    Originating Site (Patient Location): Patient's home    Distant Site (Provider Location): Provider Remote Setting- Home Office    Consent:  The patient/guardian has verbally consented to: the potential risks and benefits of telemedicine (video visit) versus in person care; bill my insurance or make self-payment for services provided; and responsibility for payment of non-covered services.     Patient would like the video invitation sent by:  Send to e-mail at: [email or web address]    Mode of Communication:  Video Conference via [employer]    Distant Location (Provider):  Off-site    As the provider I attest to compliance with applicable laws and regulations related to telemedicine.    DATA  Interactive Complexity: No  Crisis: No        Progress Since Last Session (Related to Symptoms / Goals / Homework):   Symptoms: No change.  Although, is not feeling worse.  Believes medication is working well.  Is on a antidepressant not a ADHD stimulant med.  At times reports feeling \"numb.\"    Homework: Partially completed      Episode of Care Goals: Minimal progress - CONTEMPLATION (Considering change and yet undecided); Intervened by assessing the negative and positive thinking (ambivalence) about behavior change     Current / Ongoing Stressors and Concerns:   Motivation, " "follow-through relationships.     Treatment Objective(s) Addressed in This Session:   During today's session also discussed the fact this provider will be transferring to another department and the plan for patient to transfer to another provider.  Patient agreeable.  Reviewed areas of interest beginning to develop short and long-term goals in life stated, \"I have never really thought about that.\"       Intervention:   CBT- Patient was given cognitive distortions list to review and process at next session, CBT- Patient was educated on the CBT model and asked to bring in examples at next session, Mindfulness- Patient was educated on relaxation and mindfulness techniques and Completed Safety plan              Assessments completed prior to visit:  The following assessments were completed by patient for this visit:  PHQ9:       9/21/2021     5:14 PM 12/22/2021     1:23 PM 1/18/2022    10:10 PM 2/17/2022     9:50 AM 2/28/2022    11:04 AM 3/1/2023     8:50 AM 5/8/2023     5:22 PM   PHQ-9 SCORE   PHQ-9 Total Score MyChart 23 (Severe depression) 11 (Moderate depression) 10 (Moderate depression) 10 (Moderate depression)  21 (Severe depression) 8 (Mild depression)   PHQ-9 Total Score 23 11 10 10 14 21 8     GAD7:       9/7/2021     8:46 AM 9/21/2021     5:15 PM 2/17/2022     9:49 AM 2/28/2022    11:04 AM 4/1/2022     7:26 AM 2/22/2023     9:48 AM 5/1/2023     1:00 PM   NIKITA-7 SCORE   Total Score 21 (severe anxiety) 14 (moderate anxiety) 7 (mild anxiety)  12 (moderate anxiety) 21 (severe anxiety) 10 (moderate anxiety)   Total Score 21 14 7 11 12 21 10     PROMIS 10-Global Health (all questions and answers displayed):       12/19/2021     7:33 PM 1/14/2022     7:20 PM 1/30/2022     7:38 AM 2/25/2022     8:16 AM 3/4/2022     8:59 AM 5/1/2023     1:01 PM   PROMIS 10   In general, would you say your health is: Good Good Good Good Good Fair   In general, would you say your quality of life is: Good Good Good Good Good Good   In " general, how would you rate your physical health? Good Good Good Good Good Fair   In general, how would you rate your mental health, including your mood and your ability to think? Fair Fair Good Fair Good Fair   In general, how would you rate your satisfaction with your social activities and relationships? Good Good Good Good Good Fair   In general, please rate how well you carry out your usual social activities and roles Good Good Good Fair Good Fair   To what extent are you able to carry out your everyday physical activities such as walking, climbing stairs, carrying groceries, or moving a chair? Completely Mostly Mostly Mostly Mostly A little   In the past 7 days, how often have you been bothered by emotional problems such as feeling anxious, depressed, or irritable? Often Often Often Often Sometimes Often   In the past 7 days, how would you rate your fatigue on average? Mild Moderate Moderate Moderate Moderate Mild   In the past 7 days, how would you rate your pain on average, where 0 means no pain, and 10 means worst imaginable pain? 0 0 0 0 0 0   In general, would you say your health is: 3 3 3 3 3 2   In general, would you say your quality of life is: 3 3 3 3 3 3   In general, how would you rate your physical health? 3 3 3 3 3 2   In general, how would you rate your mental health, including your mood and your ability to think? 2 2 3 2 3 2   In general, how would you rate your satisfaction with your social activities and relationships? 3 3 3 3 3 2   In general, please rate how well you carry out your usual social activities and roles. (This includes activities at home, at work and in your community, and responsibilities as a parent, child, spouse, employee, friend, etc.) 3 3 3 2 3 2   To what extent are you able to carry out your everyday physical activities such as walking, climbing stairs, carrying groceries, or moving a chair? 5 4 4 4 4 2   In the past 7 days, how often have you been bothered by emotional  problems such as feeling anxious, depressed, or irritable? 4 4 4 4 3 4   In the past 7 days, how would you rate your fatigue on average? 2 3 3 3 3 2   In the past 7 days, how would you rate your pain on average, where 0 means no pain, and 10 means worst imaginable pain? 0 0 0 0 0 0   Global Mental Health Score 10 10 11 10 12 9   Global Physical Health Score 17 15 15 15 15 13   PROMIS TOTAL - SUBSCORES 27 25 26 25 27 22         ASSESSMENT: Current Emotional / Mental Status (status of significant symptoms):   Risk status (Self / Other harm or suicidal ideation)   Patient denies current fears or concerns for personal safety.   Patient denies current or recent suicidal ideation or behaviors.   Patient denies current or recent homicidal ideation or behaviors.   Patient denies current or recent self injurious behavior or ideation.   Patient denies other safety concerns.   Patient reports there has been no change in risk factors since their last session.     Patient reports there has been no change in protective factors since their last session.     Recommended that patient call 911 or go to the local ED should there be a change in any of these risk factors.     Appearance:   Appropriate    Eye Contact:   Good    Psychomotor Behavior: Normal    Attitude:   Cooperative    Orientation:   All   Speech    Rate / Production: Normal     Volume:  Normal    Mood:    Normal   Affect:    Appropriate    Thought Content:  Clear    Thought Form:  Coherent  Logical    Insight:    Good      Medication Review:   No changes to current psychiatric medication(s)     Medication Compliance:   Yes     Changes in Health Issues:   None reported     Chemical Use Review:   Substance Use: Chemical use reviewed, no active concerns identified      Tobacco Use: No current tobacco use.      Diagnosis:  Major Depressive Disorder, Recurrent Episode, Moderate_    Collateral Reports Completed:   Routed note to PCP    PLAN: (Patient Tasks / Therapist Tasks /  Other)  1.  Patient will be seeking new therapist since provider will be leaving.  2.  Can develop treatment plan with new therapist.  3.  Continue safety plan  4.  Begin developing short and long-term life goals    Yulissa RON  5/8/23                                                           Answers for HPI/ROS submitted by the patient on 5/8/2023  If you checked off any problems, how difficult have these problems made it for you to do your work, take care of things at home, or get along with other people?: Somewhat difficult  PHQ9 TOTAL SCORE: 8  NIKITA 7 TOTAL SCORE: 10

## 2023-06-03 ENCOUNTER — HEALTH MAINTENANCE LETTER (OUTPATIENT)
Age: 33
End: 2023-06-03

## 2024-07-07 ENCOUNTER — HEALTH MAINTENANCE LETTER (OUTPATIENT)
Age: 34
End: 2024-07-07

## 2025-07-19 ENCOUNTER — HEALTH MAINTENANCE LETTER (OUTPATIENT)
Age: 35
End: 2025-07-19

## (undated) DEVICE — SOL NACL 0.9% IRRIG 1000ML BOTTLE 07138-09

## (undated) DEVICE — TAPE DRSG UNIVERSAL CLOTH 3" WHITE LATEX 881-3

## (undated) DEVICE — PIN DISTRACTION ANCHOR FOR SCR 14MM MDS9091414

## (undated) DEVICE — SUCTION FRAZIER 12FR W/HANDLE K73

## (undated) DEVICE — SU SILK 3-0 TIE 24" SA74H

## (undated) DEVICE — NDL SPINAL 22GA 3.5" QUINCKE 405181

## (undated) DEVICE — LINEN TOWEL PACK X5 5464

## (undated) DEVICE — DRAIN JACKSON PRATT 10FR ROUND SU130-1321

## (undated) DEVICE — PREP CHLORAPREP 26ML TINTED ORANGE  260815

## (undated) DEVICE — SU VICRYL 3-0 PS-2 18" UND J497G

## (undated) DEVICE — SU VICRYL 2-0 CP-2 27" UND J869H

## (undated) DEVICE — PACK SPINE SM CUSTOM SNE15SSFSK

## (undated) DEVICE — STPL SKIN PROXIMATE 35 WIDE PMW35

## (undated) DEVICE — SPONGE SURGIFOAM 100 1974

## (undated) DEVICE — DRAIN JACKSON PRATT RESERVOIR 100ML SU130-1305

## (undated) DEVICE — SUCTION CANISTER MEDIVAC LINER 3000ML W/LID 65651-530

## (undated) DEVICE — GLOVE PROTEXIS BLUE W/NEU-THERA 7.0  2D73EB70

## (undated) DEVICE — MIDAS REX DISSECTING TOOL  14MH30

## (undated) DEVICE — Device

## (undated) DEVICE — SPONGE KITTNER 31001010

## (undated) DEVICE — GLOVE PROTEXIS BLUE W/NEU-THERA 8.5  2D73EB85

## (undated) DEVICE — GOWN XXL 9575

## (undated) DEVICE — GLOVE PROTEXIS W/NEU-THERA 7.0  2D73TE70

## (undated) DEVICE — PACK UNIVERSAL SPLIT 29131

## (undated) DEVICE — DRAPE STERI TOWEL SM 1000

## (undated) DEVICE — DRAPE MAYO STAND 23X54 8337

## (undated) DEVICE — ESU ELEC BLADE 2.75" COATED/INSULATED E1455

## (undated) DEVICE — GLOVE PROTEXIS MICRO 8.5  2D73PM85

## (undated) DEVICE — DRAPE C-ARM 60X42" 1013

## (undated) DEVICE — SOL WATER IRRIG 1000ML BOTTLE 2F7114

## (undated) DEVICE — DRSG STERI STRIP 1/2X4" R1547

## (undated) DEVICE — DRAPE SHEET REV FOLD 3/4 9349

## (undated) RX ORDER — FENTANYL CITRATE 50 UG/ML
INJECTION, SOLUTION INTRAMUSCULAR; INTRAVENOUS
Status: DISPENSED
Start: 2019-04-01

## (undated) RX ORDER — CEFAZOLIN SODIUM 1 G/3ML
INJECTION, POWDER, FOR SOLUTION INTRAMUSCULAR; INTRAVENOUS
Status: DISPENSED
Start: 2019-04-01

## (undated) RX ORDER — POVIDONE-IODINE 10 MG/G
OINTMENT TOPICAL
Status: DISPENSED
Start: 2019-04-01

## (undated) RX ORDER — NEOSTIGMINE METHYLSULFATE 1 MG/ML
VIAL (ML) INJECTION
Status: DISPENSED
Start: 2019-04-01

## (undated) RX ORDER — HYDROMORPHONE HYDROCHLORIDE 1 MG/ML
INJECTION, SOLUTION INTRAMUSCULAR; INTRAVENOUS; SUBCUTANEOUS
Status: DISPENSED
Start: 2019-04-01

## (undated) RX ORDER — PROPOFOL 10 MG/ML
INJECTION, EMULSION INTRAVENOUS
Status: DISPENSED
Start: 2019-04-01

## (undated) RX ORDER — DEXAMETHASONE SODIUM PHOSPHATE 4 MG/ML
INJECTION, SOLUTION INTRA-ARTICULAR; INTRALESIONAL; INTRAMUSCULAR; INTRAVENOUS; SOFT TISSUE
Status: DISPENSED
Start: 2019-04-01

## (undated) RX ORDER — LABETALOL HYDROCHLORIDE 5 MG/ML
INJECTION, SOLUTION INTRAVENOUS
Status: DISPENSED
Start: 2019-04-01

## (undated) RX ORDER — CEFAZOLIN SODIUM IN 0.9 % NACL 3 G/100 ML
INTRAVENOUS SOLUTION, PIGGYBACK (ML) INTRAVENOUS
Status: DISPENSED
Start: 2019-04-01

## (undated) RX ORDER — GLYCOPYRROLATE 0.2 MG/ML
INJECTION, SOLUTION INTRAMUSCULAR; INTRAVENOUS
Status: DISPENSED
Start: 2019-04-01

## (undated) RX ORDER — ONDANSETRON 2 MG/ML
INJECTION INTRAMUSCULAR; INTRAVENOUS
Status: DISPENSED
Start: 2019-04-01

## (undated) RX ORDER — LIDOCAINE HYDROCHLORIDE 20 MG/ML
INJECTION, SOLUTION EPIDURAL; INFILTRATION; INTRACAUDAL; PERINEURAL
Status: DISPENSED
Start: 2019-04-01

## (undated) RX ORDER — ACETAMINOPHEN 325 MG/1
TABLET ORAL
Status: DISPENSED
Start: 2019-04-01